# Patient Record
Sex: FEMALE | Race: WHITE | NOT HISPANIC OR LATINO | URBAN - METROPOLITAN AREA
[De-identification: names, ages, dates, MRNs, and addresses within clinical notes are randomized per-mention and may not be internally consistent; named-entity substitution may affect disease eponyms.]

---

## 2019-11-29 ENCOUNTER — TELEPHONE (OUTPATIENT)
Dept: SCHEDULING | Facility: CLINIC | Age: 84
End: 2019-11-29

## 2019-11-29 NOTE — TELEPHONE ENCOUNTER
[facility] referral called.  Dr Krystal mccauley for mitral valve regurgitation  cardiologist Dr Connor Whyte  [phone]  cardiac studies [facility] asked to get discs.  Please call patient at [phone] with an appointment. ty

## 2019-12-02 ENCOUNTER — TELEPHONE (OUTPATIENT)
Dept: CARDIOTHORACIC SURGERY | Facility: CLINIC | Age: 84
End: 2019-12-02

## 2019-12-03 NOTE — TELEPHONE ENCOUNTER
Pt returning call to [facility] to discuss tests needed prior to appt.     Pt can be reached at [phone]

## 2019-12-03 NOTE — TELEPHONE ENCOUNTER
Returned pt's call and confirmed she has all studies needed for her visit. Pt was given my direct line should she have any other questions.

## 2019-12-04 PROBLEM — I73.9 PAD (PERIPHERAL ARTERY DISEASE) (CMS/HCC): Status: ACTIVE | Noted: 2019-12-04

## 2019-12-04 PROBLEM — Z86.79 HISTORY OF CEREBRAL HEMORRHAGE: Status: ACTIVE | Noted: 2019-12-04

## 2019-12-04 PROBLEM — Z87.19 HISTORY OF SMALL BOWEL OBSTRUCTION: Status: ACTIVE | Noted: 2019-12-04

## 2019-12-04 PROBLEM — G45.9 TIA (TRANSIENT ISCHEMIC ATTACK): Status: ACTIVE | Noted: 2019-12-04

## 2019-12-04 PROBLEM — I34.0 MITRAL REGURGITATION: Status: ACTIVE | Noted: 2019-12-04

## 2019-12-04 PROBLEM — Z85.038 HISTORY OF COLON CANCER: Status: ACTIVE | Noted: 2019-12-04

## 2019-12-04 PROBLEM — Z86.73 HISTORY OF CEREBROVASCULAR ACCIDENT: Status: ACTIVE | Noted: 2019-12-04

## 2020-03-09 ENCOUNTER — TELEPHONE (OUTPATIENT)
Dept: SCHEDULING | Facility: CLINIC | Age: 85
End: 2020-03-09

## 2020-03-09 NOTE — TELEPHONE ENCOUNTER
Spoke to patient's  and confirmed he has the discs we asked him to obtain and bring to the appointment.  I explained to him that there is no way to determine the format the study was copied in ( and our ability to read it on our computers) until we have the disc and open it on our system.

## 2020-03-09 NOTE — TELEPHONE ENCOUNTER
Spouse liudmila requesting call back to determine whether or not all previous cardiac test(s) that are now on disc(s) are able to be read on the computer when pt comes in for apt.    I informed spouse we can read the disc(s), but spouse wants to be sure and requests call back.    Spouse liudmila can be reached at [phone]

## 2020-04-22 ENCOUNTER — TELEPHONE (OUTPATIENT)
Dept: CARDIOTHORACIC SURGERY | Facility: CLINIC | Age: 85
End: 2020-04-22

## 2020-04-22 NOTE — TELEPHONE ENCOUNTER
Spoke to patient. She is scared to leave her house and wanted to push her appt out to June. Patient is currently scheduled for June 9th

## 2020-06-08 ENCOUNTER — TELEPHONE (OUTPATIENT)
Dept: CARDIOTHORACIC SURGERY | Facility: CLINIC | Age: 85
End: 2020-06-08

## 2020-06-08 RX ORDER — ATORVASTATIN CALCIUM 10 MG/1
1 TABLET, FILM COATED ORAL DAILY
COMMUNITY
Start: 2020-05-25

## 2020-06-08 NOTE — TELEPHONE ENCOUNTER
LM for patient to please call us back to confirm appointment with Dr Rice as well as complete covid/travel screening

## 2020-06-09 ENCOUNTER — OFFICE VISIT (OUTPATIENT)
Dept: CARDIOTHORACIC SURGERY | Facility: CLINIC | Age: 85
End: 2020-06-09
Payer: MEDICARE

## 2020-06-09 VITALS
RESPIRATION RATE: 16 BRPM | WEIGHT: 151.8 LBS | BODY MASS INDEX: 25.92 KG/M2 | HEIGHT: 64 IN | SYSTOLIC BLOOD PRESSURE: 160 MMHG | TEMPERATURE: 98.3 F | OXYGEN SATURATION: 98 % | DIASTOLIC BLOOD PRESSURE: 96 MMHG | HEART RATE: 93 BPM

## 2020-06-09 DIAGNOSIS — Z11.59 SCREENING FOR VIRAL DISEASE: Primary | ICD-10-CM

## 2020-06-09 DIAGNOSIS — I34.0 NONRHEUMATIC MITRAL VALVE REGURGITATION: ICD-10-CM

## 2020-06-09 PROCEDURE — 99214 OFFICE O/P EST MOD 30 MIN: CPT | Mod: CS | Performed by: THORACIC SURGERY (CARDIOTHORACIC VASCULAR SURGERY)

## 2020-06-09 RX ORDER — DIVALPROEX SODIUM 125 MG/1
125 TABLET, DELAYED RELEASE ORAL DAILY
COMMUNITY

## 2020-06-09 RX ORDER — ACETAMINOPHEN AND PHENYLEPHRINE HCL 325; 5 MG/1; MG/1
10000 TABLET ORAL DAILY
COMMUNITY

## 2020-06-09 RX ORDER — MUPIROCIN 20 MG/G
1 OINTMENT TOPICAL 2 TIMES DAILY
Qty: 22 G | Refills: 0 | Status: SHIPPED | OUTPATIENT
Start: 2020-06-09 | End: 2020-06-14

## 2020-06-09 RX ORDER — DIVALPROEX SODIUM 250 MG/1
250 TABLET, DELAYED RELEASE ORAL 2 TIMES DAILY
COMMUNITY

## 2020-06-09 ASSESSMENT — ENCOUNTER SYMPTOMS
ARTHRALGIAS: 0
CONFUSION: 0
BACK PAIN: 0
SHORTNESS OF BREATH: 0
DIZZINESS: 0
LIGHT-HEADEDNESS: 0
CHEST TIGHTNESS: 0
AGITATION: 0

## 2020-06-09 NOTE — LETTER
Sue 10, 2020     Connor Whyte, [place]  [address] [place] [address]    Patient: Carlos A Ziegler  YOB: 1933  Date of Visit: 6/9/2020      Dear Dr. Whyte:    Thank you for referring Carlos A Ziegler to me for evaluation. Below are my notes for this consultation.    If you have questions, please do not hesitate to call me. I look forward to following your patient along with you.         Sincerely,        Kraig Rice MD        CC: MD Krystal Rios Jr., Scott M, MD  6/10/2020  9:53 AM  Signed  I, Kraig Rice MD, personally performed the services described in this documentation as scribed by MERE Artis in my presence, and it is both accurate and complete.    6/10/2020 9:53 AM      Kraig Rice MD  6/10/2020  9:53 AM  Signed    Cardiac Surgery    Reason for consultation:MItral regurgitaion    HPI  Patient is a 87 y.o. female here for a surgical consultation for her mitral regurgitation.  This was found during her colon cancer surgery in 2016.  She was referred by Dr. Whyte.  She was seen by Dr. Porfirio Garcia and was offered surgery to repair the valve and this was cancelled d/t severe arterial disease.  She is here for a second opinion.  She currently denies symptoms of SOB, PHELAN.  She does get fatigued but this is at the end of the day after being active all day.  She does have some LE edema.  She denies chest pain, dizziness, orthopnea.  PMH:  Seizure disorder, CVA 2001, cerebral hemmorage in 2001, TIA 2016, small bowel obstruction 2018      Medical History:   Past Medical History:   Diagnosis Date   • Colon cancer (CMS/HCC)    • History of cerebral hemorrhage 12/4/2019 2001   • History of cerebrovascular accident 12/4/2019 2001   • History of colon cancer 12/4/2019   • History of small bowel obstruction 12/4/2019   • Mitral regurgitation 12/4/2019   • PAD (peripheral artery disease) (CMS/HCC) 12/4/2019   • Seizures (CMS/HCC)    • Stroke (cerebrum)  (CMS/[facility])    • TIA (transient ischemic attack) 2019       Surgical History:   Past Surgical History:   Procedure Laterality Date   • COLON SURGERY  2016   • COLON SURGERY  2016       Allergies: Sulfamethoxazole-trimethoprim; Sulfa (sulfonamide antibiotics); Trimethoprim; and Penicillins    Current Outpatient Medications   Medication Sig Dispense Refill   • biotin 10,000 mcg capsule Take 10,000 mcg by mouth daily.     • divalproex (DEPAKOTE) 125 mg EC tablet Take 125 mg by mouth daily.     • divalproex (DEPAKOTE) 250 mg EC tablet Take 250 mg by mouth 2 (two) times a day.     • LIPITOR 10 mg tablet Take 1 tablet by mouth daily.       No current facility-administered medications for this visit.        Social History:   Social History     Socioeconomic History   • Marital status:      Spouse name: None   • Number of children: None   • Years of education: None   • Highest education level: None   Occupational History   • None   Social Needs   • Financial resource strain: None   • Food insecurity:     Worry: None     Inability: None   • Transportation needs:     Medical: None     Non-medical: None   Tobacco Use   • Smoking status: Former Smoker     Packs/day: 1.00     Years: 20.00     Pack years: 20.00     Last attempt to quit: 1980     Years since quittin.4   • Smokeless tobacco: Never Used   Substance and Sexual Activity   • Alcohol use: Yes     Alcohol/week: 2.0 - 4.0 standard drinks     Types: 2 - 4 Glasses of wine per week     Comment: 2-4 weekly   • Drug use: None   • Sexual activity: None   Lifestyle   • Physical activity:     Days per week: None     Minutes per session: None   • Stress: None   Relationships   • Social connections:     Talks on phone: None     Gets together: None     Attends [employer] service: None     Active member of club or organization: None     Attends meetings of clubs or organizations: None     Relationship status: None   • Intimate partner violence:     Fear of  "current or ex partner: None     Emotionally abused: None     Physically abused: None     Forced sexual activity: None   Other Topics Concern   • None   Social History Narrative   • None       Family History:   Family History   Problem Relation Age of Onset   • Pancreatic cancer Biological Mother    • Cancer Biological Father        Review of Systems  Review of Systems   Respiratory: Negative for chest tightness and shortness of breath.    Cardiovascular: Positive for leg swelling. Negative for chest pain.   Musculoskeletal: Negative for arthralgias, back pain and gait problem.   Neurological: Negative for dizziness and light-headedness.   Psychiatric/Behavioral: Negative for agitation, behavioral problems and confusion.   All other systems reviewed and are negative.      Objective     Vital Signs for the last 24 hours:  Visit Vitals  BP (!) 160/96 (BP Location: Left upper arm, Patient Position: Sitting)   Pulse 93   Temp 36.8 °C (98.3 °F) (Oral)   Resp 16   Ht 1.626 m (5' 4\")   Wt 68.9 kg (151 lb 12.8 oz)   SpO2 98%   BMI 26.06 kg/m²       Physical Exam   Constitutional: She is oriented to person, place, and time. She appears well-developed and well-nourished.   Looks much younger then her age.   HENT:   Head: Normocephalic and atraumatic.   Eyes: Pupils are equal, round, and reactive to light. EOM are normal.   Neck: Normal range of motion. Neck supple.   Cardiovascular: Normal rate, regular rhythm, S1 normal and S2 normal.   Murmur heard.   Systolic murmur is present with a grade of 2/6.  [place] to base   Pulmonary/Chest: Effort normal and breath sounds normal.   Musculoskeletal: She exhibits edema.   Neurological: She is alert and oriented to person, place, and time.   Skin: Skin is warm and dry. Capillary refill takes 2 to 3 seconds.   Psychiatric: She has a normal mood and affect. Her behavior is normal. Judgment and thought content normal.   Vitals reviewed.    Assessment/Plan     Mitral Insufficiency  Outside " TTE, cath and CTA reviewed by Dr. Rice.  Severe mitral regurgitation with posterior prolapse, No ruptured chorde.  Ef 65%.  Symptoms not medically managed with diuretics.  NYHC II. No chest discomfort and No syncopal or presyncopal episodes. No CAD.  Calcified femoral iliac vessels. Patient tolerating medical therapy.  Plan:  STS risk for surgical intervention/repair is 2.9%.  Patient is frail.  Frailty exam deferred d/t social distancing.  Recommend a transcatheter mitral valve repair with a Mitral clip- procedure based on frailty.  She will need a EMI prior to a procedure. She will call after this is completed to discuss how to proceed.   She may be better suited to a Clasp if her MVA is on the smaller size but all of her studies are from 2019 and she would need to repeat them in order to participate.  She will need Covid testing prior to the EMI and then again prior to the procedure.  Will need a second opinion with Dr. Pretty, 6 min walk, and PAT's prior to surgery.  I have discussed with the patient and the family the rationale for the procedure as well as possible alternatives.  We discussed potential risks and complications associated with this procedure.  Time was spent educating the patient and family about their heart disease diagnosis and treatment options. All questions and concerns were addressed.The patient verbalizes understanding and agree to proceed. She is more interested in a September surgery date.      I, MERE Artis am scribing for and in the presence of MERE Eaton

## 2020-06-09 NOTE — PROGRESS NOTES
Cardiac Surgery    Reason for consultation:MItral regurgitaion    HPI  Patient is a 87 y.o. female here for a surgical consultation for her mitral regurgitation.  This was found during her colon cancer surgery in 2016.  She was referred by Dr. Whyte.  She was seen by Dr. Porfirio Garcia and was offered surgery to repair the valve and this was cancelled d/t severe arterial disease.  She is here for a second opinion.  She currently denies symptoms of SOB, PHELAN.  She does get fatigued but this is at the end of the day after being active all day.  She does have some LE edema.  She denies chest pain, dizziness, orthopnea.  PMH:  Seizure disorder, CVA 2001, cerebral hemmorage in 2001, TIA 2016, small bowel obstruction 2018      Medical History:   Past Medical History:   Diagnosis Date   • Colon cancer (CMS/[facility])    • History of cerebral hemorrhage 12/4/2019 2001   • History of cerebrovascular accident 12/4/2019 2001   • History of colon cancer 12/4/2019   • History of small bowel obstruction 12/4/2019   • Mitral regurgitation 12/4/2019   • PAD (peripheral artery disease) (CMS/[facility]) 12/4/2019   • Seizures (CMS/[facility])    • Stroke (cerebrum) (CMS/[facility])    • TIA (transient ischemic attack) 12/4/2019 2016       Surgical History:   Past Surgical History:   Procedure Laterality Date   • COLON SURGERY  04/2016   • COLON SURGERY  07/2016       Allergies: Sulfamethoxazole-trimethoprim; Sulfa (sulfonamide antibiotics); Trimethoprim; and Penicillins    Current Outpatient Medications   Medication Sig Dispense Refill   • biotin 10,000 mcg capsule Take 10,000 mcg by mouth daily.     • divalproex (DEPAKOTE) 125 mg EC tablet Take 125 mg by mouth daily.     • divalproex (DEPAKOTE) 250 mg EC tablet Take 250 mg by mouth 2 (two) times a day.     • LIPITOR 10 mg tablet Take 1 tablet by mouth daily.       No current facility-administered medications for this visit.        Social History:   Social History     Socioeconomic History   • Marital  status:      Spouse name: None   • Number of children: None   • Years of education: None   • Highest education level: None   Occupational History   • None   Social Needs   • Financial resource strain: None   • Food insecurity:     Worry: None     Inability: None   • Transportation needs:     Medical: None     Non-medical: None   Tobacco Use   • Smoking status: Former Smoker     Packs/day: 1.00     Years: 20.00     Pack years: 20.00     Last attempt to quit: 1980     Years since quittin.4   • Smokeless tobacco: Never Used   Substance and Sexual Activity   • Alcohol use: Yes     Alcohol/week: 2.0 - 4.0 standard drinks     Types: 2 - 4 Glasses of wine per week     Comment: 2-4 weekly   • Drug use: None   • Sexual activity: None   Lifestyle   • Physical activity:     Days per week: None     Minutes per session: None   • Stress: None   Relationships   • Social connections:     Talks on phone: None     Gets together: None     Attends Yarsanism service: None     Active member of club or organization: None     Attends meetings of clubs or organizations: None     Relationship status: None   • Intimate partner violence:     Fear of current or ex partner: None     Emotionally abused: None     Physically abused: None     Forced sexual activity: None   Other Topics Concern   • None   Social History Narrative   • None       Family History:   Family History   Problem Relation Age of Onset   • Pancreatic cancer Biological Mother    • Cancer Biological Father        Review of Systems  Review of Systems   Respiratory: Negative for chest tightness and shortness of breath.    Cardiovascular: Positive for leg swelling. Negative for chest pain.   Musculoskeletal: Negative for arthralgias, back pain and gait problem.   Neurological: Negative for dizziness and light-headedness.   Psychiatric/Behavioral: Negative for agitation, behavioral problems and confusion.   All other systems reviewed and are negative.      Objective  "    Vital Signs for the last 24 hours:  Visit Vitals  BP (!) 160/96 (BP Location: Left upper arm, Patient Position: Sitting)   Pulse 93   Temp 36.8 °C (98.3 °F) (Oral)   Resp 16   Ht 1.626 m (5' 4\")   Wt 68.9 kg (151 lb 12.8 oz)   SpO2 98%   BMI 26.06 kg/m²       Physical Exam   Constitutional: She is oriented to person, place, and time. She appears well-developed and well-nourished.   Looks much younger then her age.   HENT:   Head: Normocephalic and atraumatic.   Eyes: Pupils are equal, round, and reactive to light. EOM are normal.   Neck: Normal range of motion. Neck supple.   Cardiovascular: Normal rate, regular rhythm, S1 normal and S2 normal.   Murmur heard.   Systolic murmur is present with a grade of 2/6.  [place] to base   Pulmonary/Chest: Effort normal and breath sounds normal.   Musculoskeletal: She exhibits edema.   Neurological: She is alert and oriented to person, place, and time.   Skin: Skin is warm and dry. Capillary refill takes 2 to 3 seconds.   Psychiatric: She has a normal mood and affect. Her behavior is normal. Judgment and thought content normal.   Vitals reviewed.    Assessment/Plan     Mitral Insufficiency  Outside TTE, cath and CTA reviewed by Dr. Rice.  Severe mitral regurgitation with posterior prolapse, No ruptured chorde.  Ef 65%.  Symptoms not medically managed with diuretics.  NYHC II. No chest discomfort and No syncopal or presyncopal episodes. No CAD.  Calcified femoral iliac vessels. Patient tolerating medical therapy.  Plan:  STS risk for surgical intervention/repair is 2.9%.  Patient is frail.  Frailty exam deferred d/t social distancing.  Recommend a transcatheter mitral valve repair with a Mitral clip- procedure based on frailty.  She will need a EMI prior to a procedure. She will call after this is completed to discuss how to proceed.   She may be better suited to a Clasp if her MVA is on the smaller size but all of her studies are from 2019 and she would need to repeat them " in order to participate.  She will need Covid testing prior to the EMI and then again prior to the procedure.  Will need a second opinion with Dr. Pretty, 6 min walk, and TWAN's prior to surgery.  I have discussed with the patient and the family the rationale for the procedure as well as possible alternatives.  We discussed potential risks and complications associated with this procedure.  Time was spent educating the patient and family about their heart disease diagnosis and treatment options. All questions and concerns were addressed.The patient verbalizes understanding and agree to proceed. She is more interested in a September surgery date.      IEmily CRNP am scribing for and in the presence of MERE Eaton

## 2020-06-09 NOTE — LETTER
Sue 10, 2020     Connor Whyte, [place]  [address] [place] [address]    Patient: Carlos A Ziegler  YOB: 1933  Date of Visit: 6/9/2020      Dear Dr. Whyte:    Thank you for referring Carlos A Ziegler to me for evaluation. Below are my notes for this consultation.    If you have questions, please do not hesitate to call me. I look forward to following your patient along with you.         Sincerely,        Kraig Rice MD        CC: CLARA Baires Scott M, MD  6/10/2020  9:53 AM  Signed  I, Kraig Rice MD, personally performed the services described in this documentation as scribed by MERE Artis in my presence, and it is both accurate and complete.    6/10/2020 9:53 AM      Kraig Rice MD  6/10/2020  9:53 AM  Signed    Cardiac Surgery    Reason for consultation:MItral regurgitaion    HPI  Patient is a 87 y.o. female here for a surgical consultation for her mitral regurgitation.  This was found during her colon cancer surgery in 2016.  She was referred by Dr. Whyte.  She was seen by Dr. Porfirio Garcia and was offered surgery to repair the valve and this was cancelled d/t severe arterial disease.  She is here for a second opinion.  She currently denies symptoms of SOB, PHELAN.  She does get fatigued but this is at the end of the day after being active all day.  She does have some LE edema.  She denies chest pain, dizziness, orthopnea.  PMH:  Seizure disorder, CVA 2001, cerebral hemmorage in 2001, TIA 2016, small bowel obstruction 2018      Medical History:   Past Medical History:   Diagnosis Date   • Colon cancer (CMS/HCC)    • History of cerebral hemorrhage 12/4/2019 2001   • History of cerebrovascular accident 12/4/2019 2001   • History of colon cancer 12/4/2019   • History of small bowel obstruction 12/4/2019   • Mitral regurgitation 12/4/2019   • PAD (peripheral artery disease) (CMS/HCC) 12/4/2019   • Seizures (CMS/HCC)    • Stroke (cerebrum)  (CMS/[facility])    • TIA (transient ischemic attack) 2019       Surgical History:   Past Surgical History:   Procedure Laterality Date   • COLON SURGERY  2016   • COLON SURGERY  2016       Allergies: Sulfamethoxazole-trimethoprim; Sulfa (sulfonamide antibiotics); Trimethoprim; and Penicillins    Current Outpatient Medications   Medication Sig Dispense Refill   • biotin 10,000 mcg capsule Take 10,000 mcg by mouth daily.     • divalproex (DEPAKOTE) 125 mg EC tablet Take 125 mg by mouth daily.     • divalproex (DEPAKOTE) 250 mg EC tablet Take 250 mg by mouth 2 (two) times a day.     • LIPITOR 10 mg tablet Take 1 tablet by mouth daily.       No current facility-administered medications for this visit.        Social History:   Social History     Socioeconomic History   • Marital status:      Spouse name: None   • Number of children: None   • Years of education: None   • Highest education level: None   Occupational History   • None   Social Needs   • Financial resource strain: None   • Food insecurity:     Worry: None     Inability: None   • Transportation needs:     Medical: None     Non-medical: None   Tobacco Use   • Smoking status: Former Smoker     Packs/day: 1.00     Years: 20.00     Pack years: 20.00     Last attempt to quit: 1980     Years since quittin.4   • Smokeless tobacco: Never Used   Substance and Sexual Activity   • Alcohol use: Yes     Alcohol/week: 2.0 - 4.0 standard drinks     Types: 2 - 4 Glasses of wine per week     Comment: 2-4 weekly   • Drug use: None   • Sexual activity: None   Lifestyle   • Physical activity:     Days per week: None     Minutes per session: None   • Stress: None   Relationships   • Social connections:     Talks on phone: None     Gets together: None     Attends [employer] service: None     Active member of club or organization: None     Attends meetings of clubs or organizations: None     Relationship status: None   • Intimate partner violence:     Fear of  "current or ex partner: None     Emotionally abused: None     Physically abused: None     Forced sexual activity: None   Other Topics Concern   • None   Social History Narrative   • None       Family History:   Family History   Problem Relation Age of Onset   • Pancreatic cancer Biological Mother    • Cancer Biological Father        Review of Systems  Review of Systems   Respiratory: Negative for chest tightness and shortness of breath.    Cardiovascular: Positive for leg swelling. Negative for chest pain.   Musculoskeletal: Negative for arthralgias, back pain and gait problem.   Neurological: Negative for dizziness and light-headedness.   Psychiatric/Behavioral: Negative for agitation, behavioral problems and confusion.   All other systems reviewed and are negative.      Objective     Vital Signs for the last 24 hours:  Visit Vitals  BP (!) 160/96 (BP Location: Left upper arm, Patient Position: Sitting)   Pulse 93   Temp 36.8 °C (98.3 °F) (Oral)   Resp 16   Ht 1.626 m (5' 4\")   Wt 68.9 kg (151 lb 12.8 oz)   SpO2 98%   BMI 26.06 kg/m²       Physical Exam   Constitutional: She is oriented to person, place, and time. She appears well-developed and well-nourished.   Looks much younger then her age.   HENT:   Head: Normocephalic and atraumatic.   Eyes: Pupils are equal, round, and reactive to light. EOM are normal.   Neck: Normal range of motion. Neck supple.   Cardiovascular: Normal rate, regular rhythm, S1 normal and S2 normal.   Murmur heard.   Systolic murmur is present with a grade of 2/6.  [place] to base   Pulmonary/Chest: Effort normal and breath sounds normal.   Musculoskeletal: She exhibits edema.   Neurological: She is alert and oriented to person, place, and time.   Skin: Skin is warm and dry. Capillary refill takes 2 to 3 seconds.   Psychiatric: She has a normal mood and affect. Her behavior is normal. Judgment and thought content normal.   Vitals reviewed.    Assessment/Plan     Mitral Insufficiency  Outside " TTE, cath and CTA reviewed by Dr. Rice.  Severe mitral regurgitation with posterior prolapse, No ruptured chorde.  Ef 65%.  Symptoms not medically managed with diuretics.  NYHC II. No chest discomfort and No syncopal or presyncopal episodes. No CAD.  Calcified femoral iliac vessels. Patient tolerating medical therapy.  Plan:  STS risk for surgical intervention/repair is 2.9%.  Patient is frail.  Frailty exam deferred d/t social distancing.  Recommend a transcatheter mitral valve repair with a Mitral clip- procedure based on frailty.  She will need a EMI prior to a procedure. She will call after this is completed to discuss how to proceed.   She may be better suited to a Clasp if her MVA is on the smaller size but all of her studies are from 2019 and she would need to repeat them in order to participate.  She will need Covid testing prior to the EMI and then again prior to the procedure.  Will need a second opinion with Dr. Pretty, 6 min walk, and PAT's prior to surgery.  I have discussed with the patient and the family the rationale for the procedure as well as possible alternatives.  We discussed potential risks and complications associated with this procedure.  Time was spent educating the patient and family about their heart disease diagnosis and treatment options. All questions and concerns were addressed.The patient verbalizes understanding and agree to proceed. She is more interested in a September surgery date.      I, MERE Artis am scribing for and in the presence of MERE Eaton

## 2020-06-09 NOTE — LETTER
June 9, 2020     Connor Whyte, [place]  [address] [place] [address]    Patient: Carlos A Ziegler  YOB: 1933  Date of Visit: 6/9/2020      Dear Dr. Whyte:    Thank you for referring Carlos A Ziegler to me for evaluation. Below are my notes for this consultation.    If you have questions, please do not hesitate to call me. I look forward to following your patient along with you.         Sincerely,        Kraig Rice MD        CC: MD Alo Rios Jr., Samantha, CRNP  6/9/2020  2:43 PM  Sign at close encounter    Cardiac Surgery    Reason for consultation:    HPI  Patient is a 87 y.o. female here for a surgical consultation for ***.  They were referred by   ***.     Medical History:   Past Medical History:   Diagnosis Date   • Colon cancer (CMS/HCC)    • History of cerebral hemorrhage 12/4/2019 2001   • History of cerebrovascular accident 12/4/2019 2001   • History of colon cancer 12/4/2019   • History of small bowel obstruction 12/4/2019   • Mitral regurgitation 12/4/2019   • PAD (peripheral artery disease) (CMS/HCC) 12/4/2019   • Seizures (CMS/HCC)    • Stroke (cerebrum) (CMS/[facility])    • TIA (transient ischemic attack) 12/4/2019 2016       Surgical History:   Past Surgical History:   Procedure Laterality Date   • COLON SURGERY  04/2016   • COLON SURGERY  07/2016       Allergies: Sulfamethoxazole-trimethoprim; Sulfa (sulfonamide antibiotics); Trimethoprim; and Penicillins    Current Outpatient Medications   Medication Sig Dispense Refill   • biotin 10,000 mcg capsule Take 10,000 mcg by mouth daily.     • divalproex (DEPAKOTE) 125 mg EC tablet Take 125 mg by mouth daily.     • divalproex (DEPAKOTE) 250 mg EC tablet Take 250 mg by mouth 2 (two) times a day.     • LIPITOR 10 mg tablet Take 1 tablet by mouth daily.       No current facility-administered medications for this visit.        Social History:   Social History     Socioeconomic History   • Marital status:  "     Spouse name: None   • Number of children: None   • Years of education: None   • Highest education level: None   Occupational History   • None   Social Needs   • Financial resource strain: None   • Food insecurity:     Worry: None     Inability: None   • Transportation needs:     Medical: None     Non-medical: None   Tobacco Use   • Smoking status: Former Smoker     Packs/day: 1.00     Years: 20.00     Pack years: 20.00     Last attempt to quit: 1980     Years since quittin.4   • Smokeless tobacco: Never Used   Substance and Sexual Activity   • Alcohol use: Yes     Alcohol/week: 2.0 - 4.0 standard drinks     Types: 2 - 4 Glasses of wine per week     Comment: 2-4 weekly   • Drug use: None   • Sexual activity: None   Lifestyle   • Physical activity:     Days per week: None     Minutes per session: None   • Stress: None   Relationships   • Social connections:     Talks on phone: None     Gets together: None     Attends Oriental orthodox service: None     Active member of club or organization: None     Attends meetings of clubs or organizations: None     Relationship status: None   • Intimate partner violence:     Fear of current or ex partner: None     Emotionally abused: None     Physically abused: None     Forced sexual activity: None   Other Topics Concern   • None   Social History Narrative   • None       Family History:   Family History   Problem Relation Age of Onset   • Pancreatic cancer Biological Mother    • Cancer Biological Father        Review of Systems  Review of Systems    Objective     Vital Signs for the last 24 hours:  Visit Vitals  BP (!) 160/96 (BP Location: Left upper arm, Patient Position: Sitting)   Pulse 93   Temp 36.8 °C (98.3 °F) (Oral)   Resp 16   Ht 1.626 m (5' 4\")   Wt 68.9 kg (151 lb 12.8 oz)   SpO2 98%   BMI 26.06 kg/m²       Physical Exam        Assessment/Plan     Mitral Insufficiency  Outside TTE, cath and CTA reviewed by Dr. Rice.  Severe mitral regurgitation with posterior " prolapse, No ruptured chorde.  Ef 65%.  Symptoms not medically managed with diuretics.  NYHC II. No chest discomfort and No syncopal or presyncopal episodes. Patient tolerating medical therapy.  Plan:  STS risk for surgical intervention/repair is 2.9%.  Patient is frail.  Frailty exam deferred d/t social distancing.  Recommend a transcatheter mitral valve repair with a Mitral clip- procedure based on frailty.  Will need a second opinion with Dr. Pretty, 6 min walk, and PAT's prior to surgery.  I have discussed with the patient and the family the rationale for the procedure as well as possible alternatives.  We discussed potential risks and complications associated with this procedure.  The patient and family understand and agree to proceed.    I, MERE Artis am scribing for and in the presence of MERE Eaton

## 2020-06-10 NOTE — PROGRESS NOTES
I, Kraig Rice MD, personally performed the services described in this documentation as scribed by MERE Artis in my presence, and it is both accurate and complete.    6/10/2020 9:53 AM

## 2020-06-16 ENCOUNTER — DOCUMENTATION (OUTPATIENT)
Dept: CARDIOLOGY | Facility: CLINIC | Age: 85
End: 2020-06-16

## 2020-06-16 NOTE — PROGRESS NOTES
Subjective     Patient ID: Carlos A Ziegler is a 87 y.o. female.    HPI    Review of Systems    Objective     There were no vitals filed for this visit.  There is no height or weight on file to calculate BMI.    Physical Exam    Assessment/Plan       Reviewed All studies with Dr. Rice  Agree with technical aspects and indications for mitraclip in this anatomy

## 2020-06-26 ENCOUNTER — TELEPHONE (OUTPATIENT)
Dept: CARDIOLOGY | Facility: HOSPITAL | Age: 85
End: 2020-06-26

## 2020-06-26 NOTE — TELEPHONE ENCOUNTER
Spoke directly to Patient regarding scheduled EMI on 6- at 1000 a.m. Provided instructions and answered questions. She informed me that she had blood work drawn for Covid 19 on 6- at 1:00pm. Unable to reach lab where patient had her blood work performed to obtain results. Called patients PCP to see if they may have results but no covid results as of today. Will follow up on Monday morning 6-.

## 2020-06-26 NOTE — TELEPHONE ENCOUNTER
LM:  Results of Covid test  - negative , as reported from [facility] results line ( [phone] )  Fax results will be sent to [facility] (fax#[phone])    Told patient that EMI for  6/29 10am is confirmed.

## 2020-06-29 ENCOUNTER — HOSPITAL ENCOUNTER (OUTPATIENT)
Dept: CARDIOLOGY | Facility: HOSPITAL | Age: 85
Discharge: HOME | End: 2020-06-29
Attending: NURSE PRACTITIONER
Payer: MEDICARE

## 2020-06-29 VITALS
SYSTOLIC BLOOD PRESSURE: 151 MMHG | HEIGHT: 62 IN | HEART RATE: 66 BPM | DIASTOLIC BLOOD PRESSURE: 86 MMHG | RESPIRATION RATE: 22 BRPM | OXYGEN SATURATION: 98 % | WEIGHT: 145 LBS | BODY MASS INDEX: 26.68 KG/M2

## 2020-06-29 DIAGNOSIS — I34.0 NONRHEUMATIC MITRAL VALVE REGURGITATION: ICD-10-CM

## 2020-06-29 LAB — BSA FOR ECHO PROCEDURE: 1.7 M2

## 2020-06-29 PROCEDURE — 63600000 HC DRUGS/DETAIL CODE: Performed by: INTERNAL MEDICINE

## 2020-06-29 PROCEDURE — 93312 ECHO TRANSESOPHAGEAL: CPT | Mod: 26,GA | Performed by: INTERNAL MEDICINE

## 2020-06-29 PROCEDURE — 93312 ECHO TRANSESOPHAGEAL: CPT | Mod: GA

## 2020-06-29 PROCEDURE — 25000000 HC PHARMACY GENERAL: Performed by: INTERNAL MEDICINE

## 2020-06-29 PROCEDURE — 93325 DOPPLER ECHO COLOR FLOW MAPG: CPT | Mod: 26,GA | Performed by: INTERNAL MEDICINE

## 2020-06-29 PROCEDURE — 93320 DOPPLER ECHO COMPLETE: CPT | Mod: 26,GA | Performed by: INTERNAL MEDICINE

## 2020-06-29 RX ORDER — FENTANYL CITRATE 50 UG/ML
INJECTION, SOLUTION INTRAMUSCULAR; INTRAVENOUS
Status: COMPLETED | OUTPATIENT
Start: 2020-06-29 | End: 2020-06-29

## 2020-06-29 RX ORDER — LIDOCAINE HYDROCHLORIDE 20 MG/ML
JELLY TOPICAL
Status: COMPLETED | OUTPATIENT
Start: 2020-06-29 | End: 2020-06-29

## 2020-06-29 RX ORDER — MIDAZOLAM HYDROCHLORIDE 2 MG/2ML
INJECTION, SOLUTION INTRAMUSCULAR; INTRAVENOUS
Status: COMPLETED | OUTPATIENT
Start: 2020-06-29 | End: 2020-06-29

## 2020-06-29 RX ADMIN — MIDAZOLAM HYDROCHLORIDE 1 MG: 1 INJECTION, SOLUTION INTRAMUSCULAR; INTRAVENOUS at 11:23

## 2020-06-29 RX ADMIN — FENTANYL CITRATE 12.5 MCG: 50 INJECTION, SOLUTION INTRAMUSCULAR; INTRAVENOUS at 11:31

## 2020-06-29 RX ADMIN — MIDAZOLAM HYDROCHLORIDE 2 MG: 1 INJECTION, SOLUTION INTRAMUSCULAR; INTRAVENOUS at 11:14

## 2020-06-29 RX ADMIN — FENTANYL CITRATE 25 MCG: 50 INJECTION, SOLUTION INTRAMUSCULAR; INTRAVENOUS at 11:14

## 2020-06-29 RX ADMIN — MIDAZOLAM HYDROCHLORIDE 1 MG: 1 INJECTION, SOLUTION INTRAMUSCULAR; INTRAVENOUS at 11:26

## 2020-06-29 RX ADMIN — LIDOCAINE HYDROCHLORIDE 15 ML: 20 JELLY TOPICAL at 11:28

## 2020-06-29 RX ADMIN — FENTANYL CITRATE 12.5 MCG: 50 INJECTION, SOLUTION INTRAMUSCULAR; INTRAVENOUS at 11:37

## 2020-06-29 NOTE — Clinical Note
Patient position: left lateral. Bite block inserted. EMI probe inserted via blind insertion technique. Probe inserted without difficulty. EMI in progress. Complications: no complications.

## 2020-06-29 NOTE — PRE-PROCEDURE NOTE
Patient evaluated in the echolab.    Mallampati 3.    Risks of procedure signed and consent form sign. All questions addressed.Patient is moderate risk for complications because of advanced age.    Chaitanya Mejía  Cardiology Fellow PGY5  Pager [phone]

## 2020-06-29 NOTE — Clinical Note
Patient position: left lateral. Bite block removed. EMI probe removed. . Complications: no complications.

## 2020-07-03 DIAGNOSIS — I34.0 NONRHEUMATIC MITRAL VALVE REGURGITATION: ICD-10-CM

## 2020-07-08 ENCOUNTER — TELEPHONE (OUTPATIENT)
Dept: CARDIOTHORACIC SURGERY | Facility: CLINIC | Age: 85
End: 2020-07-08

## 2020-07-08 DIAGNOSIS — I34.0 NONRHEUMATIC MITRAL VALVE REGURGITATION: Primary | ICD-10-CM

## 2020-07-08 NOTE — TELEPHONE ENCOUNTER
LM for patient to discuss her EMI results and the Structural heart teams recommendations moving forward.  Asked that she return my call to discuss.

## 2020-07-09 NOTE — TELEPHONE ENCOUNTER
Spoke to patient to discuss obtaining a cardiac mri to further assess her mitral regurgitation.  She is under the impression this has been done at [facility] last August.   I do not see that in Care Everywhere.  We discussed that her disease process may have progressed since then even if it has been done and that repeating the study would be helpful.  She is interested in having this done closer to home.  She is agreeable to this.  The MRI disc will need to be sent to us for review by our team prior to an appointment.  Please call patient to discuss scheduling.  Inna- she requested you as she has worked with you before and appreciates how nice you are.

## 2020-07-15 ENCOUNTER — TELEPHONE (OUTPATIENT)
Dept: CARDIOTHORACIC SURGERY | Facility: CLINIC | Age: 85
End: 2020-07-15

## 2020-07-15 NOTE — TELEPHONE ENCOUNTER
Spoke to patient now scheduled for MRI at [facility] on 8/4/20 at 9:00AM. Script faxed [phone] Central scheduling

## 2020-07-29 NOTE — TELEPHONE ENCOUNTER
Please be sure she knows we need this on disc to review and then we will call her to discuss the plans based on those results.

## 2020-07-29 NOTE — TELEPHONE ENCOUNTER
Spoke to patient and she says Inna did go over everything with her and she will remind them to send the disc. She asks for the phone call once the disc is received and I made her aware that she will be called to discuss plans based on the results and she is happy with this.

## 2020-07-30 DIAGNOSIS — Z00.6 EXAMINATION FOR NORMAL COMPARISON OR CONTROL IN CLINICAL RESEARCH: Primary | ICD-10-CM

## 2020-07-30 NOTE — TELEPHONE ENCOUNTER
Spoke to patient and she is in agreement to mail the disc herself. She says she will mail it at the post office with a tracking number.

## 2020-07-30 NOTE — TELEPHONE ENCOUNTER
I do not think asking them to send disk is the best way to go.  She should obtain the disc herself and mail it herself with tracking info.

## 2020-08-07 DIAGNOSIS — I34.0 NONRHEUMATIC MITRAL VALVE REGURGITATION: ICD-10-CM

## 2020-08-13 NOTE — TELEPHONE ENCOUNTER
Topher, spouse wants to know if office received disc of heart mri from [facility]    [phone]

## 2020-08-13 NOTE — TELEPHONE ENCOUNTER
Tracy Bustamante MA the tracking number is  #[id number]  Feel free to call her with any other questions

## 2020-08-25 ENCOUNTER — TELEPHONE (OUTPATIENT)
Dept: CARDIOTHORACIC SURGERY | Facility: CLINIC | Age: 85
End: 2020-08-25

## 2020-08-25 NOTE — TELEPHONE ENCOUNTER
Message left for Dr. Ross who was cardiologist who read the Cardiac MRI at [facility] to see if he could provide me with flow measurements done during the MRI that in order to quantify the MR on her current study.

## 2020-08-26 ENCOUNTER — TELEPHONE (OUTPATIENT)
Dept: CARDIOTHORACIC SURGERY | Facility: CLINIC | Age: 85
End: 2020-08-26

## 2020-08-26 DIAGNOSIS — I34.0 NONRHEUMATIC MITRAL VALVE REGURGITATION: Primary | ICD-10-CM

## 2020-08-26 NOTE — TELEPHONE ENCOUNTER
Cannot do limited, will also need LV stroke volume. No elsa needed. But need LV SV and Aortic flows at same time.

## 2020-08-26 NOTE — TELEPHONE ENCOUNTER
I spoke with Dr. Rsos who acknowlwdged that no phase contrast or flows were done at the time of the Cardiac MRI.  He will be speaking to the radiology department and asking them to have the patient return for a limited study that consists of flows.  I will call patient and ask her to call me when this has been done and to obtain on disc for us to review.

## 2020-08-28 LAB
BUN SERPL-MCNC: 26 MG/DL (ref 7–25)
CREAT SERPL-MCNC: 0.9 MG/DL (ref 0.6–1.1)

## 2020-08-31 DIAGNOSIS — I34.0 NONRHEUMATIC MITRAL VALVE REGURGITATION: ICD-10-CM

## 2020-09-08 ENCOUNTER — TELEPHONE (OUTPATIENT)
Dept: SCHEDULING | Facility: CLINIC | Age: 85
End: 2020-09-08

## 2020-09-08 NOTE — TELEPHONE ENCOUNTER
Spoke to her and let her know the study is being repeated at [facility] due to lack of flows to diagnose mitral valve regurgitation severity at [facility].

## 2020-09-08 NOTE — TELEPHONE ENCOUNTER
Kylee from [facility] would like call back asap to go over MRI taken there. The specialists there are considering further testing and Kylee inquires if they are needed. She would like call back today  [phone]

## 2020-09-14 ENCOUNTER — TELEPHONE (OUTPATIENT)
Dept: CARDIOTHORACIC SURGERY | Facility: CLINIC | Age: 85
End: 2020-09-14

## 2020-09-14 ENCOUNTER — TRANSCRIBE ORDERS (OUTPATIENT)
Dept: CARDIOTHORACIC SURGERY | Facility: CLINIC | Age: 85
End: 2020-09-14

## 2020-09-14 ENCOUNTER — HOSPITAL ENCOUNTER (OUTPATIENT)
Dept: RADIOLOGY | Facility: HOSPITAL | Age: 85
Discharge: HOME | End: 2020-09-14
Attending: NURSE PRACTITIONER
Payer: MEDICARE

## 2020-09-14 DIAGNOSIS — I34.0 NONRHEUMATIC MITRAL VALVE REGURGITATION: Primary | ICD-10-CM

## 2020-09-14 DIAGNOSIS — I34.0 NONRHEUMATIC MITRAL (VALVE) INSUFFICIENCY: ICD-10-CM

## 2020-09-14 DIAGNOSIS — I34.0 NONRHEUMATIC MITRAL (VALVE) INSUFFICIENCY: Primary | ICD-10-CM

## 2020-09-14 DIAGNOSIS — I34.0 NONRHEUMATIC MITRAL VALVE REGURGITATION: ICD-10-CM

## 2020-09-14 PROCEDURE — 75557 CARDIAC MRI FOR MORPH: CPT

## 2020-09-14 PROCEDURE — 75565 CARD MRI VELOC FLOW MAPPING: CPT

## 2020-09-15 NOTE — TELEPHONE ENCOUNTER
Spoke with pts and  regarding her apt and the  is really upset not really sure where to go since they are receiving conflicting information. Please call  to explain clinical side, thank you.

## 2020-09-16 NOTE — TELEPHONE ENCOUNTER
No worries, thank you for clarifying. The  was so concerned. I will call back today and schedule for 6 months.

## 2020-09-16 NOTE — TELEPHONE ENCOUNTER
She will need a 6 month visit with an echo. (Not end of Sept) Sorry I missed the 6 part last time.  I spoke to patient and  at length and they have a clearer understanding of the additional tests we did and why our opinion differed from her other providers.

## 2021-03-23 ENCOUNTER — DOCUMENTATION (OUTPATIENT)
Dept: CARDIOTHORACIC SURGERY | Facility: CLINIC | Age: 86
End: 2021-03-23

## 2021-03-23 ENCOUNTER — HOSPITAL ENCOUNTER (OUTPATIENT)
Dept: CARDIOLOGY | Facility: HOSPITAL | Age: 86
Discharge: HOME | End: 2021-03-23
Attending: NURSE PRACTITIONER
Payer: MEDICARE

## 2021-03-23 ENCOUNTER — OFFICE VISIT (OUTPATIENT)
Dept: CARDIOTHORACIC SURGERY | Facility: CLINIC | Age: 86
End: 2021-03-23
Payer: MEDICARE

## 2021-03-23 VITALS
DIASTOLIC BLOOD PRESSURE: 80 MMHG | SYSTOLIC BLOOD PRESSURE: 147 MMHG | HEIGHT: 62 IN | BODY MASS INDEX: 26.68 KG/M2 | WEIGHT: 145 LBS

## 2021-03-23 VITALS
BODY MASS INDEX: 27.05 KG/M2 | TEMPERATURE: 96 F | RESPIRATION RATE: 16 BRPM | OXYGEN SATURATION: 94 % | WEIGHT: 147 LBS | HEIGHT: 62 IN | SYSTOLIC BLOOD PRESSURE: 189 MMHG | HEART RATE: 95 BPM | DIASTOLIC BLOOD PRESSURE: 100 MMHG

## 2021-03-23 DIAGNOSIS — I34.0 NONRHEUMATIC MITRAL VALVE REGURGITATION: ICD-10-CM

## 2021-03-23 DIAGNOSIS — I34.0 MITRAL VALVE INSUFFICIENCY, UNSPECIFIED ETIOLOGY: Primary | ICD-10-CM

## 2021-03-23 LAB
AORTIC ROOT ANNULUS - M-MODE: 2.84 CM
AV PEAK GRADIENT: 6.55 MMHG
AV PEAK VELOCITY-S: 1.28 M/S
AV REGURGITATION PRESSURE HALF TIME: 635.05 MS
BSA FOR ECHO PROCEDURE: 1.7 M2
CUSP SEPARATION: 1.88 CM
DOP CALC RVOT VTI: 12.75 CM
E WAVE DECELERATION TIME: 165.85 MS
E/A RATIO: 1.41
E/E' RATIO: 22.16
E/LAT E' RATIO: 21.39
EDV (BP): 66.81 ML
EF (A4C): 73.29 %
EF A2C: 58.15 %
EJECTION FRACTION: 64.61 %
ESV (BP): 23.65 ML
LA ESV (BP): 75.96 ML
LA ESV INDEX (A2C): 45.59 ML/M2
LA ESV INDEX (BP): 44.68 ML/M2
LA/AORTA RATIO: 1.61
LAAS-AP2: 25.67 CM2
LAAS-AP4: 23.97 CM2
LAD 2D - M-MODE: 4.56 CM
LAV-S: 77.5 ML
LEFT ATRIUM VOLUME INDEX: 42.24 ML/M2
LEFT ATRIUM VOLUME: 71.8 ML
LEFT VENTRICLE DIASTOLIC VOLUME INDEX: 37.88 ML/M2
LEFT VENTRICLE DIASTOLIC VOLUME: 64.4 ML
LEFT VENTRICLE SYSTOLIC VOLUME INDEX: 10.12 ML/M2
LEFT VENTRICLE SYSTOLIC VOLUME: 17.2 ML
LV DIASTOLIC VOLUME: 66.9 ML
LV ESV (APICAL 2 CHAMBER): 28 ML
LVCI: 1.46 LITERS PER MINUTE PER SQUARE METER
LVCO: 2.44 LITERS PER MINUTE
LVEDVI(A2C): 39.35 ML/M2
LVEDVI(BP): 39.3 ML/M2
LVESVI(A2C): 16.47 ML/M2
LVESVI(BP): 13.91 ML/M2
LVOT MG: 1.59 MMHG
LVOT MV: 0.59 M/S
LVOT PEAK VELOCITY: 0.91 M/S
LVOT PG: 3.32 MMHG
LVOT VTI: 23.56 CM
MR FLOW: 57.95 ML
MR VELOCITY: 5.73 M/S
MV E'TISSUE VEL-LAT: 0.07 M/S
MV E'TISSUE VEL-MED: 0.07 M/S
MV PEAK A VEL: 1.06 M/S
MV PEAK E VEL: 1.5 M/S
MV VALVE AREA P 1/2 METHOD: 4.57 CM2
MV VALVE AREA PISA METHOD: 28.16 SQUARE MILLIMETER
PISA ALIAS VEL MV: 0.35 M/S
PISA RADIUS: 0.86 CM
TRICUSPID VALVE PEAK REGURGITATION VELOCITY: 3.19 M/S

## 2021-03-23 PROCEDURE — 99214 OFFICE O/P EST MOD 30 MIN: CPT | Performed by: THORACIC SURGERY (CARDIOTHORACIC VASCULAR SURGERY)

## 2021-03-23 PROCEDURE — 93306 TTE W/DOPPLER COMPLETE: CPT | Mod: 26 | Performed by: INTERNAL MEDICINE

## 2021-03-23 PROCEDURE — 93306 TTE W/DOPPLER COMPLETE: CPT

## 2021-03-23 RX ORDER — METOPROLOL SUCCINATE 25 MG/1
25 TABLET, EXTENDED RELEASE ORAL DAILY
Qty: 30 TABLET | Refills: 3 | Status: SHIPPED | OUTPATIENT
Start: 2021-03-23 | End: 2022-04-27

## 2021-03-23 ASSESSMENT — ENCOUNTER SYMPTOMS
CHEST TIGHTNESS: 0
LIGHT-HEADEDNESS: 0
FATIGUE: 1
CONFUSION: 0
ARTHRALGIAS: 1
SHORTNESS OF BREATH: 0
PALPITATIONS: 0
BACK PAIN: 1
AGITATION: 0
DIZZINESS: 0

## 2021-03-23 NOTE — LETTER
March 23, 2021     Dany Sullivan Jr., MD  [address]  [address]  [facility] [address]    Patient: Carlos A Ziegler  YOB: 1933  Date of Visit: 3/23/2021      Dear Dr. Sullivan:    Thank you for referring Carlos A Ziegler to me for evaluation. Below are my notes for this consultation.    If you have questions, please do not hesitate to call me. I look forward to following your patient along with you.         Sincerely,        Kraig Rice MD        CC: MD Alo Gaona Samantha, CRNP  3/23/2021  2:56 PM  Sign when Signing Visit    Cardiac Surgery    Reason for consultation:Mitral Regurgitation    HPI  Patient is a 87 y.o. female here for a return surgical consultation for her mitral regurgitation surveillence.  This was found during her colon cancer surgery in 2016.  She was referred by Dr. Whyte.  She was seen by Dr. Porfirio Garcia in July 2019 and was offered surgery to repair the valve with a robot but this was cancelled d/t severe arterial disease and inability to do minimally invasive approach.  She was then seen here for a second opinion.  At her last visit it was recommend to have a transcatheter mitral valve repair with a Mitral clip procedure.  She had a EMI prior to that procedure and her mitral regurgitation was found to be moderate.  Her procedure was then postponed as she was asymptomatic on no medications or atrial fibrillation.  She currently denies SOB, PHELAN, LE edema, chest pain, dizziness, orthopnea.  She currently is limited to activity due to this pain.   Her biggest complaint is fatigue.  She relates this to her advanced age.  Her  is here today and states he has never seen her SOB.  PMH:  Seizure disorder, CVA 2001, cerebral hemmorage in 2001, TIA 2016, small bowel obstruction 2018      PCP:  Dany Sullivan  Cardiologist: Dr. Aaron Whyte    Medical History:   Past Medical History:   Diagnosis Date   • Colon cancer (CMS/HCC)    • History of cerebral  hemorrhage 2019   • History of cerebrovascular accident 2019   • History of colon cancer 2019   • History of small bowel obstruction 2019   • Mitral regurgitation 2019   • PAD (peripheral artery disease) (CMS/[facility]) 2019   • Seizures (CMS/[facility])    • Stroke (cerebrum) (CMS/[facility])    • TIA (transient ischemic attack) 2019       Surgical History:   Past Surgical History:   Procedure Laterality Date   • COLON SURGERY  2016   • COLON SURGERY  2016       Allergies: Sulfamethoxazole-trimethoprim, Sulfa (sulfonamide antibiotics), Trimethoprim, and Penicillins    Current Outpatient Medications   Medication Sig Dispense Refill   • biotin 10,000 mcg capsule Take 10,000 mcg by mouth daily.     • divalproex (DEPAKOTE) 125 mg EC tablet Take 125 mg by mouth daily.     • divalproex (DEPAKOTE) 250 mg EC tablet Take 250 mg by mouth 2 (two) times a day.     • LIPITOR 10 mg tablet Take 1 tablet by mouth daily.       No current facility-administered medications for this visit.        Social History:   Social History     Socioeconomic History   • Marital status:      Spouse name: None   • Number of children: None   • Years of education: None   • Highest education level: None   Occupational History   • None   Social Needs   • Financial resource strain: None   • Food insecurity     Worry: None     Inability: None   • Transportation needs     Medical: None     Non-medical: None   Tobacco Use   • Smoking status: Former Smoker     Packs/day: 1.00     Years: 20.00     Pack years: 20.00     Quit date:      Years since quittin.2   • Smokeless tobacco: Never Used   Substance and Sexual Activity   • Alcohol use: Yes     Alcohol/week: 2.0 - 4.0 standard drinks     Types: 2 - 4 Glasses of wine per week     Comment: 2-4 weekly   • Drug use: Never   • Sexual activity: Defer   Lifestyle   • Physical activity     Days per week: None     Minutes per session: None   • Stress: None  "  Relationships   • Social connections     Talks on phone: None     Gets together: None     Attends Yarsanism service: None     Active member of club or organization: None     Attends meetings of clubs or organizations: None     Relationship status: None   • Intimate partner violence     Fear of current or ex partner: None     Emotionally abused: None     Physically abused: None     Forced sexual activity: None   Other Topics Concern   • None   Social History Narrative   • None       Family History:   Family History   Problem Relation Age of Onset   • Pancreatic cancer Biological Mother    • Cancer Biological Father        Review of Systems  Review of Systems   Constitutional: Positive for fatigue.   Respiratory: Negative for chest tightness and shortness of breath.    Cardiovascular: Negative for chest pain, palpitations and leg swelling.   Musculoskeletal: Positive for arthralgias, back pain and gait problem.   Neurological: Negative for dizziness and light-headedness.   Psychiatric/Behavioral: Negative for agitation, behavioral problems and confusion.   All other systems reviewed and are negative.      Objective     Vital Signs for the last 24 hours:  Visit Vitals  BP (!) 225/132 (BP Location: Right upper arm, Patient Position: Sitting)   Pulse 95   Temp (!) 35.6 °C (96 °F) (Temporal)   Resp 16   Ht 1.575 m (5' 2\")   Wt 66.7 kg (147 lb)   SpO2 94%   BMI 26.89 kg/m²       Physical Exam  Vitals signs reviewed.   Constitutional:       General: She is not in acute distress.     Appearance: She is well-developed.   HENT:      Head: Normocephalic.   Eyes:      Pupils: Pupils are equal, round, and reactive to light.   Neck:      Musculoskeletal: Normal range of motion and neck supple.      Vascular: No JVD.   Cardiovascular:      Rate and Rhythm: Normal rate and regular rhythm.      Pulses: Normal pulses.      Heart sounds: S1 normal and S2 normal. Murmur present. Systolic murmur present with a grade of 3/6.      " Comments: [place] to   Pulmonary:      Effort: Pulmonary effort is normal.      Breath sounds: Normal breath sounds.   Abdominal:      General: Bowel sounds are normal.      Palpations: Abdomen is soft.   Musculoskeletal: Normal range of motion.         General: No swelling.      Right lower le+ Edema present.      Left lower le+ Edema present.   Skin:     General: Skin is warm and dry.      Capillary Refill: Capillary refill takes 2 to 3 seconds.   Neurological:      General: No focal deficit present.      Mental Status: She is alert and oriented to person, place, and time. Mental status is at baseline.   Psychiatric:         Mood and Affect: Mood normal.         Behavior: Behavior normal.         Thought Content: Thought content normal.         Judgment: Judgment normal.         Cardiac Imaging   TRANSESOPHAGEAL ECHO (EMI) 2020    Narrative The risks of the procedure were discussed with the patient and informed   consent was obtained. The patient was sedated with 4 mg IV Versed and 50    mcg IV Fentanyl. The EMI probe was inserted without difficulty and the   patient tolerated the procedure well.    1. Normal left ventricular wall thickness and cavity with preserved   systolic function. Estimated EF 60 - 65 %. No regional wall motion   abnormalities.   2. Three cusped aortic valve. Mild aortic regurgitation. Normal aortic   dimensions. Simple atheroma in the visualized portions of the descending   aorta without complex elements.   3. Moderate mitral regurgitation with eccentric anteriorly directed jet   due to small flail segment of P2 with ruptured chordae. Pisa radius 0.7cm,   EROA 0.19 cm2.  Mean transmitral gradient 2mmHg. Normal pulmonary vein   flow by doppler.   4. Normal left atrial size. No thrombus or mass seen in the left atrium or   left atrial appendage.   5. Lipomatous hypertrophy of the interatrial septum. No shunt across.   6. Normal right ventricular size with preserved systolic  function. Normal   right atrial size.    7. Structurally normal tricuspid valve. Trace tricuspid regurgitation.   Estimated right ventricular systolic pressure of 20 mmHg + RAP.  8. Pulmonic valve not well visualized.   9. No pericardial effusion.   10. No previous study for direct comparison.            .  Assessment/Plan   Mitral Insufficiency  Assessment:   Echoes reviewed by Dr. Dr. Rice.  Severe mitral regurgitation with posterior prolapse.  Degenerative MR. E wave 1.4, Normal LV size, moderate LA size, Mild Tricuspid regurgitation, RSVP 40mmHG, Ef 65.  Symptoms not medically managed with diuretics.  NYHC I. No chest discomfort and No syncopal or presyncopal episodes. Patient not on any medical therapy.    Plan:  STS risk for surgical intervention/repair is 2.9%.  Patient is frail and failed frailty exam.  Recommend a transcatheter mitral valve repair with a Mitral Clap- procedure.  She was presented with the opportunity to enroll in the Mitral Clasp device.      Prior to surgery she will need to be started on antihypertensives.  Will start her on Toprol 25mg daily.  She will need to folllow with her PCP or cardiologist closer to home to monitor and increase her medications if needed.      Will need a  CTA chest abdomen and pelvis- disc that which was done at [facility].  Will also need Carotid ultrasound, 6 min walk, dental evaluation( given form) and PAT's prior to surgery.  She will also need to see an interventional cardiologist.  We will have her come back for reassessment with Dr. White in a few weeks and get signed up with a date at that time.        Surgery date will be offered at the next visit..    Will NOT need to stop any current medications before surgery. The mupirocin prescription will be sent once a surgery date set.  It will be sent to the pharmacy and will begin 5 days in advance of surgery as ordered.      I have discussed with the patient and the family the rationale for the  procedure as well as possible alternatives.  We discussed potential risks and complications associated with this procedure.  The risks include, but are not limited to: bleeding, infection, arrhythmias, myocardial infarction, stroke, death, renal as well as pulmonary insufficiency and the possibility of blood transfusions,  permanent pacemaker placement intra-aortic balloon placement,  assist device placement, cardiac wire perforation, and requirement of CPB.  Time was spent educating the patient and family about their heart disease diagnosis and treatment options. All questions and concerns were addressed.The patient and family understand and agree to proceed.      IEmily CRNP am scribing for and in the presence of {doctorname:[id number]}      MERE Artis

## 2021-03-23 NOTE — PROGRESS NOTES
Cardiac Surgery    Reason for consultation:Mitral Regurgitation    HPI  Patient is a 87 y.o. female here for a return surgical consultation for her mitral regurgitation surveillence.  This was found during her colon cancer surgery in 2016.  She was referred by Dr. Whyte.  She was seen by Dr. Porfirio Garcia in July 2019 and was offered surgery to repair the valve with a robot but this was cancelled d/t severe arterial disease and inability to do minimally invasive approach.  She was then seen here for a second opinion.  At her last visit it was recommend to have a transcatheter mitral valve repair with a Mitral clip procedure.  She had a EMI prior to that procedure and her mitral regurgitation was found to be moderate.  Her procedure was then postponed as she was asymptomatic on no medications or atrial fibrillation.  She currently denies SOB, PHELAN, LE edema, chest pain, dizziness, orthopnea.  She currently is limited to activity due to this pain.   Her biggest complaint is fatigue.  She relates this to her advanced age.  Her  is here today and states he has never seen her SOB.  PMH:  Seizure disorder, CVA 2001, cerebral hemmorage in 2001, TIA 2016, small bowel obstruction 2018      PCP:  Dany Sullivan  Cardiologist: Dr. Aaron Whyte    Medical History:   Past Medical History:   Diagnosis Date   • Colon cancer (CMS/HCC)    • History of cerebral hemorrhage 12/4/2019 2001   • History of cerebrovascular accident 12/4/2019 2001   • History of colon cancer 12/4/2019   • History of small bowel obstruction 12/4/2019   • Mitral regurgitation 12/4/2019   • PAD (peripheral artery disease) (CMS/HCC) 12/4/2019   • Seizures (CMS/HCC)    • Stroke (cerebrum) (CMS/HCC)    • TIA (transient ischemic attack) 12/4/2019 2016       Surgical History:   Past Surgical History:   Procedure Laterality Date   • COLON SURGERY  04/2016   • COLON SURGERY  07/2016       Allergies: Sulfamethoxazole-trimethoprim, Sulfa (sulfonamide  antibiotics), Trimethoprim, and Penicillins    Current Outpatient Medications   Medication Sig Dispense Refill   • biotin 10,000 mcg capsule Take 10,000 mcg by mouth daily.     • divalproex (DEPAKOTE) 125 mg EC tablet Take 125 mg by mouth daily.     • divalproex (DEPAKOTE) 250 mg EC tablet Take 250 mg by mouth 2 (two) times a day.     • LIPITOR 10 mg tablet Take 1 tablet by mouth daily.       No current facility-administered medications for this visit.        Social History:   Social History     Socioeconomic History   • Marital status:      Spouse name: None   • Number of children: None   • Years of education: None   • Highest education level: None   Occupational History   • None   Social Needs   • Financial resource strain: None   • Food insecurity     Worry: None     Inability: None   • Transportation needs     Medical: None     Non-medical: None   Tobacco Use   • Smoking status: Former Smoker     Packs/day: 1.00     Years: 20.00     Pack years: 20.00     Quit date:      Years since quittin.2   • Smokeless tobacco: Never Used   Substance and Sexual Activity   • Alcohol use: Yes     Alcohol/week: 2.0 - 4.0 standard drinks     Types: 2 - 4 Glasses of wine per week     Comment: 2-4 weekly   • Drug use: Never   • Sexual activity: Defer   Lifestyle   • Physical activity     Days per week: None     Minutes per session: None   • Stress: None   Relationships   • Social connections     Talks on phone: None     Gets together: None     Attends Protestant service: None     Active member of club or organization: None     Attends meetings of clubs or organizations: None     Relationship status: None   • Intimate partner violence     Fear of current or ex partner: None     Emotionally abused: None     Physically abused: None     Forced sexual activity: None   Other Topics Concern   • None   Social History Narrative   • None       Family History:   Family History   Problem Relation Age of Onset   • Pancreatic  "cancer Biological Mother    • Cancer Biological Father        Review of Systems  Review of Systems   Constitutional: Positive for fatigue.   Respiratory: Negative for chest tightness and shortness of breath.    Cardiovascular: Negative for chest pain, palpitations and leg swelling.   Musculoskeletal: Positive for arthralgias, back pain and gait problem.   Neurological: Negative for dizziness and light-headedness.   Psychiatric/Behavioral: Negative for agitation, behavioral problems and confusion.   All other systems reviewed and are negative.      Objective     Vital Signs for the last 24 hours:  Visit Vitals  BP (!) 225/132 (BP Location: Right upper arm, Patient Position: Sitting)   Pulse 95   Temp (!) 35.6 °C (96 °F) (Temporal)   Resp 16   Ht 1.575 m (5' 2\")   Wt 66.7 kg (147 lb)   SpO2 94%   BMI 26.89 kg/m²       Physical Exam  Vitals signs reviewed.   Constitutional:       General: She is not in acute distress.     Appearance: She is well-developed.   HENT:      Head: Normocephalic.   Eyes:      Pupils: Pupils are equal, round, and reactive to light.   Neck:      Musculoskeletal: Normal range of motion and neck supple.      Vascular: No JVD.   Cardiovascular:      Rate and Rhythm: Normal rate and regular rhythm.      Pulses: Normal pulses.      Heart sounds: S1 normal and S2 normal. Murmur present. Systolic murmur present with a grade of 3/6.      Comments: [place] to   Pulmonary:      Effort: Pulmonary effort is normal.      Breath sounds: Normal breath sounds.   Abdominal:      General: Bowel sounds are normal.      Palpations: Abdomen is soft.   Musculoskeletal: Normal range of motion.         General: No swelling.      Right lower le+ Edema present.      Left lower le+ Edema present.   Skin:     General: Skin is warm and dry.      Capillary Refill: Capillary refill takes 2 to 3 seconds.   Neurological:      General: No focal deficit present.      Mental Status: She is alert and oriented to person, " place, and time. Mental status is at baseline.   Psychiatric:         Mood and Affect: Mood normal.         Behavior: Behavior normal.         Thought Content: Thought content normal.         Judgment: Judgment normal.         Cardiac Imaging   TRANSESOPHAGEAL ECHO (EMI) 06/29/2020    Narrative The risks of the procedure were discussed with the patient and informed   consent was obtained. The patient was sedated with 4 mg IV Versed and 50    mcg IV Fentanyl. The EMI probe was inserted without difficulty and the   patient tolerated the procedure well.    1. Normal left ventricular wall thickness and cavity with preserved   systolic function. Estimated EF 60 - 65 %. No regional wall motion   abnormalities.   2. Three cusped aortic valve. Mild aortic regurgitation. Normal aortic   dimensions. Simple atheroma in the visualized portions of the descending   aorta without complex elements.   3. Moderate mitral regurgitation with eccentric anteriorly directed jet   due to small flail segment of P2 with ruptured chordae. Pisa radius 0.7cm,   EROA 0.19 cm2.  Mean transmitral gradient 2mmHg. Normal pulmonary vein   flow by doppler.   4. Normal left atrial size. No thrombus or mass seen in the left atrium or   left atrial appendage.   5. Lipomatous hypertrophy of the interatrial septum. No shunt across.   6. Normal right ventricular size with preserved systolic function. Normal   right atrial size.    7. Structurally normal tricuspid valve. Trace tricuspid regurgitation.   Estimated right ventricular systolic pressure of 20 mmHg + RAP.  8. Pulmonic valve not well visualized.   9. No pericardial effusion.   10. No previous study for direct comparison.            .  Assessment/Plan   Mitral Insufficiency  Assessment:   Echoes reviewed by Dr. Dr. Rice.  Severe mitral regurgitation with posterior prolapse.  Degenerative MR. E wave 1.4, Normal LV size, moderate LA size, Mild Tricuspid regurgitation, RSVP 40mmHG, Ef 65.  Symptoms  not medically managed with diuretics.  [facility] I. No chest discomfort and No syncopal or presyncopal episodes. Patient not on any medical therapy.    Plan:  STS risk for surgical intervention/repair is 2.9%.  Patient is frail and failed frailty exam.  Recommend a transcatheter mitral valve repair with a Mitral Clap- procedure.  She was presented with the opportunity to enroll in the Mitral Clasp device.      Prior to surgery she will need to be started on antihypertensives.  Will start her on Toprol 25mg daily.  She will need to folllow with her PCP or cardiologist closer to home to monitor and increase her medications if needed.      Will need a  CTA chest abdomen and pelvis- disc that which was done at [facility].  Will also need Carotid ultrasound, 6 min walk, dental evaluation( given form) and PAT's prior to surgery.  She will also need to see an interventional cardiologist.  We will have her come back for reassessment with Dr. White in a few weeks and get signed up with a date at that time.        Surgery date will be offered at the next visit..    Will NOT need to stop any current medications before surgery. The mupirocin prescription will be sent once a surgery date set.  It will be sent to the pharmacy and will begin 5 days in advance of surgery as ordered.      I have discussed with the patient and the family the rationale for the procedure as well as possible alternatives.  We discussed potential risks and complications associated with this procedure.  The risks include, but are not limited to: bleeding, infection, arrhythmias, myocardial infarction, stroke, death, renal as well as pulmonary insufficiency and the possibility of blood transfusions,  permanent pacemaker placement intra-aortic balloon placement,  assist device placement, cardiac wire perforation, and requirement of CPB.  Time was spent educating the patient and family about their heart disease diagnosis and treatment options. All questions  and concerns were addressed.The patient and family understand and agree to proceed.      I, MERE Artis am scribing for and in the presence of Dr.Dr. Rice    I, Kraig Rice MD, personally performed the services described in this documentation as scribed by MERE Artis in my presence, and it is both accurate and complete.    3/24/2021 6:51 AM    MERE Artis

## 2021-03-23 NOTE — LETTER
March 23, 2021     CELESTE Louis C  [address]  [facility]  [place] [place] [address]    Patient: Carlos A Ziegler  YOB: 1933  Date of Visit: 3/23/2021      Dear Dr. Joy:    Thank you for referring Carlos A Ziegler to me for evaluation. Below are my notes for this consultation.    If you have questions, please do not hesitate to call me. I look forward to following your patient along with you.         Sincerely,        Kraig Rice MD        CC: No Recipients  Emily Prajapati CRNP  3/23/2021  2:56 PM  Sign when Signing Visit    Cardiac Surgery    Reason for consultation:Mitral Regurgitation    HPI  Patient is a 87 y.o. female here for a return surgical consultation for her mitral regurgitation surveillence.  This was found during her colon cancer surgery in 2016.  She was referred by Dr. Whyte.  She was seen by Dr. Porfirio Garcia in July 2019 and was offered surgery to repair the valve with a robot but this was cancelled d/t severe arterial disease and inability to do minimally invasive approach.  She was then seen here for a second opinion.  At her last visit it was recommend to have a transcatheter mitral valve repair with a Mitral clip procedure.  She had a EMI prior to that procedure and her mitral regurgitation was found to be moderate.  Her procedure was then postponed as she was asymptomatic on no medications or atrial fibrillation.  She currently denies SOB, PHELAN, LE edema, chest pain, dizziness, orthopnea.  She currently is limited to activity due to this pain.   Her biggest complaint is fatigue.  She relates this to her advanced age.  Her  is here today and states he has never seen her SOB.  PMH:  Seizure disorder, CVA 2001, cerebral hemmorage in 2001, TIA 2016, small bowel obstruction 2018      PCP:  Dany Sullivan  Cardiologist: Dr. Aaron Whyte    Medical History:   Past Medical History:   Diagnosis Date   • Colon cancer (CMS/HCC)    • History  of cerebral hemorrhage 2019   • History of cerebrovascular accident 2019   • History of colon cancer 2019   • History of small bowel obstruction 2019   • Mitral regurgitation 2019   • PAD (peripheral artery disease) (CMS/[facility]) 2019   • Seizures (CMS/[facility])    • Stroke (cerebrum) (CMS/[facility])    • TIA (transient ischemic attack) 2019       Surgical History:   Past Surgical History:   Procedure Laterality Date   • COLON SURGERY  2016   • COLON SURGERY  2016       Allergies: Sulfamethoxazole-trimethoprim, Sulfa (sulfonamide antibiotics), Trimethoprim, and Penicillins    Current Outpatient Medications   Medication Sig Dispense Refill   • biotin 10,000 mcg capsule Take 10,000 mcg by mouth daily.     • divalproex (DEPAKOTE) 125 mg EC tablet Take 125 mg by mouth daily.     • divalproex (DEPAKOTE) 250 mg EC tablet Take 250 mg by mouth 2 (two) times a day.     • LIPITOR 10 mg tablet Take 1 tablet by mouth daily.       No current facility-administered medications for this visit.        Social History:   Social History     Socioeconomic History   • Marital status:      Spouse name: None   • Number of children: None   • Years of education: None   • Highest education level: None   Occupational History   • None   Social Needs   • Financial resource strain: None   • Food insecurity     Worry: None     Inability: None   • Transportation needs     Medical: None     Non-medical: None   Tobacco Use   • Smoking status: Former Smoker     Packs/day: 1.00     Years: 20.00     Pack years: 20.00     Quit date:      Years since quittin.2   • Smokeless tobacco: Never Used   Substance and Sexual Activity   • Alcohol use: Yes     Alcohol/week: 2.0 - 4.0 standard drinks     Types: 2 - 4 Glasses of wine per week     Comment: 2-4 weekly   • Drug use: Never   • Sexual activity: Defer   Lifestyle   • Physical activity     Days per week: None     Minutes per session: None   •  "Stress: None   Relationships   • Social connections     Talks on phone: None     Gets together: None     Attends Gnosticism service: None     Active member of club or organization: None     Attends meetings of clubs or organizations: None     Relationship status: None   • Intimate partner violence     Fear of current or ex partner: None     Emotionally abused: None     Physically abused: None     Forced sexual activity: None   Other Topics Concern   • None   Social History Narrative   • None       Family History:   Family History   Problem Relation Age of Onset   • Pancreatic cancer Biological Mother    • Cancer Biological Father        Review of Systems  Review of Systems   Constitutional: Positive for fatigue.   Respiratory: Negative for chest tightness and shortness of breath.    Cardiovascular: Negative for chest pain, palpitations and leg swelling.   Musculoskeletal: Positive for arthralgias, back pain and gait problem.   Neurological: Negative for dizziness and light-headedness.   Psychiatric/Behavioral: Negative for agitation, behavioral problems and confusion.   All other systems reviewed and are negative.      Objective     Vital Signs for the last 24 hours:  Visit Vitals  BP (!) 225/132 (BP Location: Right upper arm, Patient Position: Sitting)   Pulse 95   Temp (!) 35.6 °C (96 °F) (Temporal)   Resp 16   Ht 1.575 m (5' 2\")   Wt 66.7 kg (147 lb)   SpO2 94%   BMI 26.89 kg/m²       Physical Exam  Vitals signs reviewed.   Constitutional:       General: She is not in acute distress.     Appearance: She is well-developed.   HENT:      Head: Normocephalic.   Eyes:      Pupils: Pupils are equal, round, and reactive to light.   Neck:      Musculoskeletal: Normal range of motion and neck supple.      Vascular: No JVD.   Cardiovascular:      Rate and Rhythm: Normal rate and regular rhythm.      Pulses: Normal pulses.      Heart sounds: S1 normal and S2 normal. Murmur present. Systolic murmur present with a grade of " 3/6.      Comments: [place] to   Pulmonary:      Effort: Pulmonary effort is normal.      Breath sounds: Normal breath sounds.   Abdominal:      General: Bowel sounds are normal.      Palpations: Abdomen is soft.   Musculoskeletal: Normal range of motion.         General: No swelling.      Right lower le+ Edema present.      Left lower le+ Edema present.   Skin:     General: Skin is warm and dry.      Capillary Refill: Capillary refill takes 2 to 3 seconds.   Neurological:      General: No focal deficit present.      Mental Status: She is alert and oriented to person, place, and time. Mental status is at baseline.   Psychiatric:         Mood and Affect: Mood normal.         Behavior: Behavior normal.         Thought Content: Thought content normal.         Judgment: Judgment normal.         Cardiac Imaging   TRANSESOPHAGEAL ECHO (EMI) 2020    Narrative The risks of the procedure were discussed with the patient and informed   consent was obtained. The patient was sedated with 4 mg IV Versed and 50    mcg IV Fentanyl. The EMI probe was inserted without difficulty and the   patient tolerated the procedure well.    1. Normal left ventricular wall thickness and cavity with preserved   systolic function. Estimated EF 60 - 65 %. No regional wall motion   abnormalities.   2. Three cusped aortic valve. Mild aortic regurgitation. Normal aortic   dimensions. Simple atheroma in the visualized portions of the descending   aorta without complex elements.   3. Moderate mitral regurgitation with eccentric anteriorly directed jet   due to small flail segment of P2 with ruptured chordae. Pisa radius 0.7cm,   EROA 0.19 cm2.  Mean transmitral gradient 2mmHg. Normal pulmonary vein   flow by doppler.   4. Normal left atrial size. No thrombus or mass seen in the left atrium or   left atrial appendage.   5. Lipomatous hypertrophy of the interatrial septum. No shunt across.   6. Normal right ventricular size with preserved  systolic function. Normal   right atrial size.    7. Structurally normal tricuspid valve. Trace tricuspid regurgitation.   Estimated right ventricular systolic pressure of 20 mmHg + RAP.  8. Pulmonic valve not well visualized.   9. No pericardial effusion.   10. No previous study for direct comparison.            .  Assessment/Plan   Mitral Insufficiency  Assessment:   Echoes reviewed by Dr. Dr. Rice.  Severe mitral regurgitation with posterior prolapse.  Degenerative MR. E wave 1.4, Normal LV size, moderate LA size, Mild Tricuspid regurgitation, RSVP 40mmHG, Ef 65.  Symptoms not medically managed with diuretics.  [facility] I. No chest discomfort and No syncopal or presyncopal episodes. Patient not on any medical therapy.    Plan:  STS risk for surgical intervention/repair is 2.9%.  Patient is frail and failed frailty exam.  Recommend a transcatheter mitral valve repair with a Mitral Clap- procedure.  She was presented with the opportunity to enroll in the Mitral Clasp device.      Prior to surgery she will need to be started on antihypertensives.  Will start her on Toprol 25mg daily.  She will need to folllow with her PCP or cardiologist closer to home to monitor and increase her medications if needed.      Will need a  CTA chest abdomen and pelvis- disc that which was done at [facility].  Will also need Carotid ultrasound, 6 min walk, dental evaluation( given form) and PAT's prior to surgery.  She will also need to see an interventional cardiologist.  We will have her come back for reassessment with Dr. White in a few weeks and get signed up with a date at that time.        Surgery date will be offered at the next visit..    Will NOT need to stop any current medications before surgery. The mupirocin prescription will be sent once a surgery date set.  It will be sent to the pharmacy and will begin 5 days in advance of surgery as ordered.      I have discussed with the patient and the family the rationale for  the procedure as well as possible alternatives.  We discussed potential risks and complications associated with this procedure.  The risks include, but are not limited to: bleeding, infection, arrhythmias, myocardial infarction, stroke, death, renal as well as pulmonary insufficiency and the possibility of blood transfusions,  permanent pacemaker placement intra-aortic balloon placement,  assist device placement, cardiac wire perforation, and requirement of CPB.  Time was spent educating the patient and family about their heart disease diagnosis and treatment options. All questions and concerns were addressed.The patient and family understand and agree to proceed.      Emily RODARTE CRNP am scribing for and in the presence of MERE Keen

## 2021-03-23 NOTE — LETTER
March 24, 2021     Dany Sullivan Jr., MD  [address]  [address]  [facility] [address]    Patient: Carlos A Ziegler  YOB: 1933  Date of Visit: 3/23/2021      Dear Dr. Sullivan:    Thank you for referring Carlos A Ziegler to me for evaluation. Below are my notes for this consultation.    If you have questions, please do not hesitate to call me. I look forward to following your patient along with you.         Sincerely,        Kraig Rice MD        CC: MD Krystal Gaona Scott M, MD  3/24/2021  6:51 AM  Signed    Cardiac Surgery    Reason for consultation:Mitral Regurgitation    HPI  Patient is a 87 y.o. female here for a return surgical consultation for her mitral regurgitation surveillence.  This was found during her colon cancer surgery in 2016.  She was referred by Dr. Whyte.  She was seen by Dr. Porfirio Garcia in July 2019 and was offered surgery to repair the valve with a robot but this was cancelled d/t severe arterial disease and inability to do minimally invasive approach.  She was then seen here for a second opinion.  At her last visit it was recommend to have a transcatheter mitral valve repair with a Mitral clip procedure.  She had a EMI prior to that procedure and her mitral regurgitation was found to be moderate.  Her procedure was then postponed as she was asymptomatic on no medications or atrial fibrillation.  She currently denies SOB, PHELAN, LE edema, chest pain, dizziness, orthopnea.  She currently is limited to activity due to this pain.   Her biggest complaint is fatigue.  She relates this to her advanced age.  Her  is here today and states he has never seen her SOB.  PMH:  Seizure disorder, CVA 2001, cerebral hemmorage in 2001, TIA 2016, small bowel obstruction 2018      PCP:  Dany Sullivan  Cardiologist: Dr. Aaron Whyte    Medical History:   Past Medical History:   Diagnosis Date   • Colon cancer (CMS/HCC)    • History of cerebral hemorrhage 12/4/2019        • History of cerebrovascular accident 2019   • History of colon cancer 2019   • History of small bowel obstruction 2019   • Mitral regurgitation 2019   • PAD (peripheral artery disease) (CMS/[facility]) 2019   • Seizures (CMS/[facility])    • Stroke (cerebrum) (CMS/[facility])    • TIA (transient ischemic attack) 2019       Surgical History:   Past Surgical History:   Procedure Laterality Date   • COLON SURGERY  2016   • COLON SURGERY  2016       Allergies: Sulfamethoxazole-trimethoprim, Sulfa (sulfonamide antibiotics), Trimethoprim, and Penicillins    Current Outpatient Medications   Medication Sig Dispense Refill   • biotin 10,000 mcg capsule Take 10,000 mcg by mouth daily.     • divalproex (DEPAKOTE) 125 mg EC tablet Take 125 mg by mouth daily.     • divalproex (DEPAKOTE) 250 mg EC tablet Take 250 mg by mouth 2 (two) times a day.     • LIPITOR 10 mg tablet Take 1 tablet by mouth daily.       No current facility-administered medications for this visit.        Social History:   Social History     Socioeconomic History   • Marital status:      Spouse name: None   • Number of children: None   • Years of education: None   • Highest education level: None   Occupational History   • None   Social Needs   • Financial resource strain: None   • Food insecurity     Worry: None     Inability: None   • Transportation needs     Medical: None     Non-medical: None   Tobacco Use   • Smoking status: Former Smoker     Packs/day: 1.00     Years: 20.00     Pack years: 20.00     Quit date:      Years since quittin.2   • Smokeless tobacco: Never Used   Substance and Sexual Activity   • Alcohol use: Yes     Alcohol/week: 2.0 - 4.0 standard drinks     Types: 2 - 4 Glasses of wine per week     Comment: 2-4 weekly   • Drug use: Never   • Sexual activity: Defer   Lifestyle   • Physical activity     Days per week: None     Minutes per session: None   • Stress: None   Relationships   •  "Social connections     Talks on phone: None     Gets together: None     Attends Muslim service: None     Active member of club or organization: None     Attends meetings of clubs or organizations: None     Relationship status: None   • Intimate partner violence     Fear of current or ex partner: None     Emotionally abused: None     Physically abused: None     Forced sexual activity: None   Other Topics Concern   • None   Social History Narrative   • None       Family History:   Family History   Problem Relation Age of Onset   • Pancreatic cancer Biological Mother    • Cancer Biological Father        Review of Systems  Review of Systems   Constitutional: Positive for fatigue.   Respiratory: Negative for chest tightness and shortness of breath.    Cardiovascular: Negative for chest pain, palpitations and leg swelling.   Musculoskeletal: Positive for arthralgias, back pain and gait problem.   Neurological: Negative for dizziness and light-headedness.   Psychiatric/Behavioral: Negative for agitation, behavioral problems and confusion.   All other systems reviewed and are negative.      Objective     Vital Signs for the last 24 hours:  Visit Vitals  BP (!) 225/132 (BP Location: Right upper arm, Patient Position: Sitting)   Pulse 95   Temp (!) 35.6 °C (96 °F) (Temporal)   Resp 16   Ht 1.575 m (5' 2\")   Wt 66.7 kg (147 lb)   SpO2 94%   BMI 26.89 kg/m²       Physical Exam  Vitals signs reviewed.   Constitutional:       General: She is not in acute distress.     Appearance: She is well-developed.   HENT:      Head: Normocephalic.   Eyes:      Pupils: Pupils are equal, round, and reactive to light.   Neck:      Musculoskeletal: Normal range of motion and neck supple.      Vascular: No JVD.   Cardiovascular:      Rate and Rhythm: Normal rate and regular rhythm.      Pulses: Normal pulses.      Heart sounds: S1 normal and S2 normal. Murmur present. Systolic murmur present with a grade of 3/6.      Comments: [place] to "   Pulmonary:      Effort: Pulmonary effort is normal.      Breath sounds: Normal breath sounds.   Abdominal:      General: Bowel sounds are normal.      Palpations: Abdomen is soft.   Musculoskeletal: Normal range of motion.         General: No swelling.      Right lower le+ Edema present.      Left lower le+ Edema present.   Skin:     General: Skin is warm and dry.      Capillary Refill: Capillary refill takes 2 to 3 seconds.   Neurological:      General: No focal deficit present.      Mental Status: She is alert and oriented to person, place, and time. Mental status is at baseline.   Psychiatric:         Mood and Affect: Mood normal.         Behavior: Behavior normal.         Thought Content: Thought content normal.         Judgment: Judgment normal.         Cardiac Imaging   TRANSESOPHAGEAL ECHO (EMI) 2020    Narrative The risks of the procedure were discussed with the patient and informed   consent was obtained. The patient was sedated with 4 mg IV Versed and 50    mcg IV Fentanyl. The EMI probe was inserted without difficulty and the   patient tolerated the procedure well.    1. Normal left ventricular wall thickness and cavity with preserved   systolic function. Estimated EF 60 - 65 %. No regional wall motion   abnormalities.   2. Three cusped aortic valve. Mild aortic regurgitation. Normal aortic   dimensions. Simple atheroma in the visualized portions of the descending   aorta without complex elements.   3. Moderate mitral regurgitation with eccentric anteriorly directed jet   due to small flail segment of P2 with ruptured chordae. Pisa radius 0.7cm,   EROA 0.19 cm2.  Mean transmitral gradient 2mmHg. Normal pulmonary vein   flow by doppler.   4. Normal left atrial size. No thrombus or mass seen in the left atrium or   left atrial appendage.   5. Lipomatous hypertrophy of the interatrial septum. No shunt across.   6. Normal right ventricular size with preserved systolic function. Normal   right  atrial size.    7. Structurally normal tricuspid valve. Trace tricuspid regurgitation.   Estimated right ventricular systolic pressure of 20 mmHg + RAP.  8. Pulmonic valve not well visualized.   9. No pericardial effusion.   10. No previous study for direct comparison.            .  Assessment/Plan   Mitral Insufficiency  Assessment:   Echoes reviewed by Dr. Dr. Rice.  Severe mitral regurgitation with posterior prolapse.  Degenerative MR. E wave 1.4, Normal LV size, moderate LA size, Mild Tricuspid regurgitation, RSVP 40mmHG, Ef 65.  Symptoms not medically managed with diuretics.  [facility] I. No chest discomfort and No syncopal or presyncopal episodes. Patient not on any medical therapy.    Plan:  STS risk for surgical intervention/repair is 2.9%.  Patient is frail and failed frailty exam.  Recommend a transcatheter mitral valve repair with a Mitral Clap- procedure.  She was presented with the opportunity to enroll in the Mitral Clasp device.      Prior to surgery she will need to be started on antihypertensives.  Will start her on Toprol 25mg daily.  She will need to folllow with her PCP or cardiologist closer to home to monitor and increase her medications if needed.      Will need a  CTA chest abdomen and pelvis- disc that which was done at [facility].  Will also need Carotid ultrasound, 6 min walk, dental evaluation( given form) and PAT's prior to surgery.  She will also need to see an interventional cardiologist.  We will have her come back for reassessment with Dr. White in a few weeks and get signed up with a date at that time.        Surgery date will be offered at the next visit..    Will NOT need to stop any current medications before surgery. The mupirocin prescription will be sent once a surgery date set.  It will be sent to the pharmacy and will begin 5 days in advance of surgery as ordered.      I have discussed with the patient and the family the rationale for the procedure as well as possible  alternatives.  We discussed potential risks and complications associated with this procedure.  The risks include, but are not limited to: bleeding, infection, arrhythmias, myocardial infarction, stroke, death, renal as well as pulmonary insufficiency and the possibility of blood transfusions,  permanent pacemaker placement intra-aortic balloon placement,  assist device placement, cardiac wire perforation, and requirement of CPB.  Time was spent educating the patient and family about their heart disease diagnosis and treatment options. All questions and concerns were addressed.The patient and family understand and agree to proceed.      IEmily CRNP am scribing for and in the presence of Kraig Ramirez MD, personally performed the services described in this documentation as scribed by MERE Artis in my presence, and it is both accurate and complete.    3/24/2021 6:51 AM    MERE Artis

## 2021-03-23 NOTE — PROGRESS NOTES
The following baseline frailty exams were completed:    __ fail__  Test  __ fail__ 5 Meter Walk Test  __ fail__ Subjective        1. Albumin Testing  Serum Albumin: - No results found for: ALBUMIN     Height: 157cm    Weight: 66.7kg  BMI: 26.89  Date Obtained - 03/23/21   Note: Albumin Level < 3.6 g/dL meets cutoff for frailty.    2. Kwan Activities of Daily Living    Activities Points (1 or 0 ) INDEPENDENCE: (1 POINT) NO supervision, direction or personal assistance   DEPENDENCE: (0 POINTS) WITH supervision, direction, personal assistance, or total care   Bathing Points   1 (1 POINT) Bathes self completely or needs help in bathing only a single part of the body such as the back, genital area or disabled extremity.   (0 POINTS) Needs help with bathing more than one part of thebody, getting in or out of the tub or shower.  Requires total bathing   Dressing Points   1 (1 POINT) Gets clothes from closets and drawers and puts on clothes and outer garments complete with fasteners. May have help tying shoes.   (0 POINTS) Needs help with dressing self or needs to be completely dressed.   Toileting Points   1 1 POINT) Goes to toilet, gets on and off, arranges clothes, cleans genital area without help.   (0 POINTS) Needs help transferring to the toilet, cleaning self or uses bedpan or commode.   Transferring Points   1 1 POINT) Moves in and out of bed or chair unassisted. Mechanical transferring aides are acceptable.   (0 POINTS) Needs help in moving from bed to chair or requires a complete transfer.   Continence Points   1 (1 POINT) Exercises complete self control over urination and defecation.   (0 POINTS) Is partially or totally incontinent of bowel or bladder.   Feeding Points   1 1 POINT) Gets food from plate into mouth without help. Preparation of food may be done by another person.   0 POINTS) Needs partial or totalhelp with feeding or requires parenteral feeding.     Total Points   6         Note: if patient  "completes 2/6 or less he or she meets cutoff for frailty.       3.  Strength  Note to the Examiner: Elbow should be at a 90 degree angle, with arm not resting on table or \"pinned\" against chest wall. All trials should be completed with the dynamometer in the dominant hand.    Grasp 1: 20 kg  Grasp 2: 18 kg  Grasp 3: 18 kg  AV.7 kg    Men                                        Cutoff for  strength (Kg) criterion for frailty  BMI   < 24                                  < 29 kg  BMI   24.1 - 26                           < 30 kg  BMI   26.1 - 28                           < 30 kg  BMI   > 28                                  < 32 kg    Women                                   Cutoff for  strength (Kg) criterion for frailty  BMI   <  23                                  < 17 kg  BMI   23.1 - 26                            < 17.3 kg  BMI   26.1 - 29                            < 18 kg  BMI   >  29                                  < 21 kg    (Appendix, Fried et al)  _________________________________________________________________    4. 5 Meter Walk Test    5M Walk 1: ___7.36___  s/5m  5M Walk 2: ___6.82____s/5m  5M Walk 3: ___6.93____s/5m    5M Walk AVG:___7.04__ s/5m    Utilized walking aid? no    Men                                         Cutoff time to walk 15 feet criterion for frailty  Height < 173 cm                      > 7 seconds  Height > 173 cm                      > 6 seconds    Women  Height < 159 cm                       >7 seconds  Height > 159 cm                       >6 seconds    Note: If patient exceeds walk-time according to height in above table he/she meets req for frailty.  [place] Cardiomyopathy Questionnaire (KCCQ-12)    The following questions refer to your heart failure and how it may affect your life. Please read and complete the following  questions. There are no right or wrong answers. Please maria t the answer that best applies to you.      1. Heart failure affects different " people in different ways. Some feel shortness of breath while others feel fatigue. Please  indicate how much you are limited by heart failure (shortness of breath or fatigue) in your ability to do the following  activities over the past 2 weeks.    Activity     Extremely Limited    (1) Quite a bit Limited    (2) Moderately Limited    (3) Slightly Limited    (4) Not At All Limited    (5) Limited for other reasons or do not do the activity   (6)     Score   A. Showering/bathing       4   B. Walking 1 geovani on level ground       3   C. Hurrying or jogging  (as if to catch a bus)       6         2. Over the past 2 weeks, how many times did you have swelling in your feet, ankles or legs when you woke up in the morning?      Every morning    (1) 3 or more times  per week but  not every day  (2) 1-2 times per week     (3) Less than once a week     (4) Never over the past 2 weeks     (5)     Score          5         3. Over the past 2 weeks, on average, how many times has fatigue limited your ability to do what you wanted?    All of  the time    (1) Several times  per day    (2) At least  once a day    (3) 3 or more times  per week but  not every day  (4) 1-2 times  per week    (5) Less than  once a week    (6) Never over the  past 2 weeks    (7)     Score            5         4. Over the past 2 weeks, on average, how many times has shortness of breath limited your ability to do what you wanted?    All of  the time    (1) Several times  per day    (2) At least  once a day    (3) 3 or more times  per week but  not every day  (4) 1-2 times  per week    (5) Less than  once a week    (6) Never over the  past 2 weeks    (7)   Score            5         5. Over the past 2 weeks, on average, how many times have you been forced to sleep sitting up in a chair or with at  least 3 pillows to prop you up because of shortness of breath?      Every morning    (1) 3 or more times  per week but  not every day  (2) 1-2 times per week     (3)  Less than once a week     (4) Never over the past 2 weeks     (5)   Score          5         6. Over the past 2 weeks, how much has your heart failure limited your enjoyment of life?    It has extremely  limited my enjoyment  of life    (1) It has limited my  enjoyment of life  quite a bit      (2) It has moderately  limited my enjoyment  of life    (3) It has slightly  limited my enjoyment  of life    (4) It has not limited  my enjoyment  of life at all      (5)     Score          5         7. If you had to spend the rest of your life with your heart failure the way it is right now, how would you feel about this?    Not at all  satisfied    (1) Mostly  dissatisfied    (2) Somewhat  satisfied    (3) Mostly  satisfied    (4) Completely  satisfied    (5)   Score          5         8. How much does your heart failure affect your lifestyle? Please indicate how your heart failure may have limited your  participation in the following activities over the past 2 weeks.        Activity     Severely Limited    (1) Limited  Quite a bit     (2) Moderately Limited    (3) Slightly Limited    (4) Did not limit at all    (5) Does not apply or did not do for other reasons  (6)   Score   A.  Hobbies, recreational  activities         5   B. Working or doing  household chores         3   C. Visiting family or  friends out of your  home         6

## 2021-04-08 ENCOUNTER — TELEPHONE (OUTPATIENT)
Dept: CARDIOTHORACIC SURGERY | Facility: CLINIC | Age: 86
End: 2021-04-08

## 2021-04-08 DIAGNOSIS — I34.0 MITRAL VALVE INSUFFICIENCY, UNSPECIFIED ETIOLOGY: Primary | ICD-10-CM

## 2021-04-08 NOTE — TELEPHONE ENCOUNTER
Pt has a Dr. White appt on 4/13.  She will need CTA c/a/p and a repeat EMI (as per research) prior to MV clasp. Could we get these prior to Dr. White appt? Assume we will be moving BG appt then.

## 2021-04-08 NOTE — TELEPHONE ENCOUNTER
Was able to get patient in tomorrow for CTA and covid at [facility] as well as EMI on Monday but she is not happy with this. She says we are aware that her CTA was complete and she is bringing the disc to us on Tuesday. Also, she says she recently had a EMI and is unsure why were making her do this again. She would like a call back from someone to talk over this. Can you call her at . TY!

## 2021-04-08 NOTE — TELEPHONE ENCOUNTER
Received call from pt.  Pt stated she could not make the Monday, Apr 12th, EMI and that she already had a CT of her Abd/Pelvis.  She has the CT ready to give us.      She asked if the EMI could be moved to Tuesday Apr 13th because she will be coming in to see Dr White on that day.  She reported finding it difficult to get a ride at times.

## 2021-04-08 NOTE — TELEPHONE ENCOUNTER
Can you put EMI order in so I can take a look at the schedule, please? She lives 1 hr 22 mins away so this may be tricky. I can try to get her CTA with her covid tomorrow but unsure if tomorrow will have any CTA's available. I can offer her to go closer to home if not and bring us the disc for CTA. EMI will have to be Monday if there is any availability and she willing to be covid tested tomorrow.

## 2021-04-08 NOTE — TELEPHONE ENCOUNTER
I dont think so. Were going back and forth in staff messages trying to figure out her scheduling. She lives far but she needs to be covid tested prior to EMI so its hard. Patient is in contact with Shivani so I dont think there is a need to call. TY!

## 2021-04-08 NOTE — PROGRESS NOTES
Several calls placed back to pt to discuss appointment dates for EMI.  Pt reluctant to come down so early in the am d/t her distance from the facility.  She states she will have to leave the house at 5 in the morning to make the EMI appt as 1st case. She is worried about a ride, work traffic, and commuting that early in the am.      Have kept Floresita Lawler and Stuart Prajapati in the loop with pt's concerns.

## 2021-04-09 ENCOUNTER — TELEPHONE (OUTPATIENT)
Dept: CARDIOTHORACIC SURGERY | Facility: CLINIC | Age: 86
End: 2021-04-09

## 2021-04-09 NOTE — TELEPHONE ENCOUNTER
Spoke to the pt and spouse several times via phone this morning regarding upcoming appt for EMI and Dr White, as well as covid test prior to EMI.    appt for 4/27/2021 at 0815 EMI, followed by Christopher at 0940.  Covid test will be at [address] [id number].      Messaged EDIS Prajapati and EDIS Miguel to let them know the pt will need a script sent to the [facility] and provided the address.      Last phone call I left a message to please call me with any questions when I relayed their appointment information.

## 2021-04-15 ENCOUNTER — TELEPHONE (OUTPATIENT)
Dept: CARDIOTHORACIC SURGERY | Facility: CLINIC | Age: 86
End: 2021-04-15

## 2021-04-15 NOTE — TELEPHONE ENCOUNTER
Called pt to let her know appointments on Apr 27 were switched around.  She will see Dr White at 840 am and will have her EMI afterwards.   I let her know I would see her when she goes in to see Dr White.

## 2021-04-16 ENCOUNTER — TELEPHONE (OUTPATIENT)
Dept: CARDIOTHORACIC SURGERY | Facility: CLINIC | Age: 86
End: 2021-04-16

## 2021-04-16 DIAGNOSIS — I34.0 NONRHEUMATIC MITRAL VALVE REGURGITATION: Primary | ICD-10-CM

## 2021-04-16 DIAGNOSIS — Z11.59 SCREENING FOR VIRAL DISEASE: ICD-10-CM

## 2021-04-22 ENCOUNTER — TELEPHONE (OUTPATIENT)
Dept: CARDIOTHORACIC SURGERY | Facility: CLINIC | Age: 86
End: 2021-04-22

## 2021-04-22 NOTE — TELEPHONE ENCOUNTER
Pt went to [facility] in [place] and they did not have her script for blood work or the covid test.  Pt is currently at an urgent care getting a covid test for EMI prep.  I let her know not to worry about the labs.  We can do them when she is in to visit Dr White before her EMI.

## 2021-04-22 NOTE — TELEPHONE ENCOUNTER
Patient called and said [facility] did not receive my fax even with 3 completed faxes. The woman I spoke to at [facility] gave me her personal fax of  and fax was completed.

## 2021-04-23 ENCOUNTER — TELEPHONE (OUTPATIENT)
Dept: CARDIOLOGY | Facility: HOSPITAL | Age: 86
End: 2021-04-23

## 2021-04-23 NOTE — TELEPHONE ENCOUNTER
Left message for Floresita Peterson re: COVID results for patient. Checking if patient is bringing a copy of negative result report with her on Tuesday or if your group is faxing us the results prior to Tuesday morning. Her EMI is on 4/27 at 1000. Thanks in advance.

## 2021-04-26 ENCOUNTER — TELEPHONE (OUTPATIENT)
Dept: CARDIOLOGY | Facility: HOSPITAL | Age: 86
End: 2021-04-26

## 2021-04-26 NOTE — PROGRESS NOTES
Interventional Cardiology/Structural Heart Program    Reason for Consultation: Mitral Regurgitation.   Referring Provider:    NELSY  Carlos A Ziegler is a 87 y.o. female  who presents today for a 2nd opinion evaluation for a HIPOLITO of mitral valve for mitral regurgation.  Mitral regurgitatoin was found during her colon cancer surgery in 2016 (s/p chemo and resection).  She was referred by Dr. Whyte.  She was seen by Dr. Porfirio Garcia in July 2019 and was offered surgery to repair the valve with a robot but this was cancelled d/t severe arterial disease and inability to do minimally invasive approach.  She was then seen here  for a second opinion by Dr. Rice on 3/23/2021, it was recommend to have a transcatheter mitral valve repair with a Mitral clip procedure.  She had a EMI prior to that planned procedure and her mitral regurgitation was found to be moderate.  Her procedure was then postponed as she was asymptomatic on no medications or atrial fibrillation.     Patient complains of fatigue.  Associated symptoms of LE edema.  Patient rates symptoms as severe.        She currently denies SOB, PHELAN, chest pain, dizziness, orthopnea. She currently is limited to activity due to this pain.   Her biggest complaint is fatigue.  She relates this to her advanced age.  Her  is here today and states he has never seen her SOB.      PMH includes seizure disorder, CVA 2001, cerebral hemmorage in 2001, TIA 2016, and small bowel obstruction 2018.      PCP:  Dany Sullivan  Cardiologist: Dr. Aaron Whyte        Past Medical History:   Diagnosis Date   • Colon cancer (CMS/HCC)    • History of cerebral hemorrhage 12/4/2019 2001   • History of cerebrovascular accident 12/4/2019 2001   • History of colon cancer 12/4/2019   • History of small bowel obstruction 12/4/2019   • Mitral regurgitation 12/4/2019   • PAD (peripheral artery disease) (CMS/HCC) 12/4/2019   • Seizures (CMS/HCC)    • Stroke (cerebrum) (CMS/HCC)    • TIA  (transient ischemic attack) 2019          Past Surgical History:   Procedure Laterality Date   • COLON SURGERY  2016   • COLON SURGERY  2016        Allergies:  Allergies   Allergen Reactions   • Sulfamethoxazole-Trimethoprim Anaphylaxis   • Sulfa (Sulfonamide Antibiotics) Other (see comments)     Other reaction(s): swelling of hands and throat (moderate)   • Trimethoprim Other (see comments)     Other reaction(s): swelling of hands and throat (moderate)   • Penicillins Hives     Other reaction(s): hives          Current Medications:   Current Outpatient Medications   Medication Sig Dispense Refill   • biotin 10,000 mcg capsule Take 10,000 mcg by mouth daily.     • divalproex (DEPAKOTE) 125 mg EC tablet Take 125 mg by mouth daily.     • divalproex (DEPAKOTE) 250 mg EC tablet Take 250 mg by mouth 2 (two) times a day.     • LIPITOR 10 mg tablet Take 1 tablet by mouth daily.     • metoprolol succinate XL (TOPROL XL) 25 mg 24 hr tablet Take 1 tablet (25 mg total) by mouth daily. 30 tablet 3     No current facility-administered medications for this visit.          Social History     Socioeconomic History   • Marital status:      Spouse name: Not on file   • Number of children: Not on file   • Years of education: Not on file   • Highest education level: Not on file   Occupational History   • Not on file   Tobacco Use   • Smoking status: Former Smoker     Packs/day: 1.00     Years: 20.00     Pack years: 20.00     Quit date:      Years since quittin.3   • Smokeless tobacco: Never Used   Vaping Use   • Vaping Use: Never used   Substance and Sexual Activity   • Alcohol use: Yes     Alcohol/week: 2.0 - 4.0 standard drinks     Types: 2 - 4 Glasses of wine per week     Comment: 2-4 weekly   • Drug use: Never   • Sexual activity: Defer   Other Topics Concern   • Not on file   Social History Narrative   • Not on file     Social Determinants of Health     Financial Resource Strain:    • Difficulty  "of Paying Living Expenses:    Food Insecurity:    • Worried About Running Out of Food in the Last Year:    • Ran Out of Food in the Last Year:    Transportation Needs:    • Lack of Transportation (Medical):    • Lack of Transportation (Non-Medical):    Physical Activity:    • Days of Exercise per Week:    • Minutes of Exercise per Session:    Stress:    • Feeling of Stress :    Social Connections:    • Frequency of Communication with Friends and Family:    • Frequency of Social Gatherings with Friends and Family:    • Attends Islam Services:    • Active Member of Clubs or Organizations:    • Attends Club or Organization Meetings:    • Marital Status:    Intimate Partner Violence:    • Fear of Current or Ex-Partner:    • Emotionally Abused:    • Physically Abused:    • Sexually Abused:         Family History:  Family History   Problem Relation Age of Onset   • Pancreatic cancer Biological Mother    • Cancer Biological Father         A 14-point review of system was performed and was negative, or as documented above. 10 systems examined.      Physical Exam     Visit Vitals  BP (!) 152/90 (BP Location: Left upper arm, Patient Position: Sitting)   Pulse 94   Ht 1.575 m (5' 2\")   Wt 65 kg (143 lb 4.8 oz)   SpO2 96%   BMI 26.21 kg/m²       General: WDWN, No acute distress  HEENT: EOMI.  PERRLA.  Nares patent.  Mucous membranes moist. No xanthelasma.  Neck: No jugular vein distention.  Carotids are equal with no audible bruits.  No lymphadenopathy or thyromegaly.  Heart: Regular rate and rhythm.  Normal S1-S2. Systolic murmur present with grade 3/6  Lungs: Clear to auscultation bilaterally.  No rales or wheezes.  Abdomen: Soft and nontender.  Bowel sounds are present.   Extremities: No cyanosis clubbing or edema.  Distal pulses are intact. Bilateral LE edema.    Skin: Warm, dry and well perfused.  Psych: Normal mood and judgment.  Neurologic: Alert and oriented ×3.       Review of Studies:  see CT Surgery note dated " 3/23/2021 for full review.    TTE 6/29/2021    The risks of the procedure were discussed with the patient and informed consent was obtained. The patient was sedated with 4 mg IV Versed and 50  mcg IV Fentanyl. The EMI probe was inserted without difficulty and the patient tolerated the procedure well.     1. Normal left ventricular wall thickness and cavity with preserved systolic function. Estimated EF 60 - 65 %. No regional wall motion abnormalities.   2. Three cusped aortic valve. Mild aortic regurgitation. Normal aortic dimensions. Simple atheroma in the visualized portions of the descending aorta without complex elements.   3. Moderate mitral regurgitation with eccentric anteriorly directed jet due to small flail segment of P2 with ruptured chordae. Pisa radius 0.7cm, EROA 0.19 cm2.  Mean transmitral gradient 2mmHg. Normal pulmonary vein flow by doppler.   4. Normal left atrial size. No thrombus or mass seen in the left atrium or left atrial appendage.   5. Lipomatous hypertrophy of the interatrial septum. No shunt across.   6. Normal right ventricular size with preserved systolic function. Normal right atrial size.    7. Structurally normal tricuspid valve. Trace tricuspid regurgitation. Estimated right ventricular systolic pressure of 20 mmHg + RAP.  8. Pulmonic valve not well visualized.   9. No pericardial effusion.   10. No previous study for direct comparison.        TTE 3/23/2021  · Mild left ventricular dilatation.  Normal wall thickness and preserved systolic function. Estimated EF 65%. No regional wall motion abnormalities.Aortic valve is tricuspid. Mild aortic insufficiency. No aortic stenosis. Normal size of aortic root. Ascending aorta is not well visualized.  · Sclerotic mitral leaflets with bileaflet mitral prolapse. Prior EMI on 6/29/20 showed flail P2 segment due to ruptured chordae.  There is severe mitral insufficiency.  There are 2 eccentric jets directed both anteriorly and posteriorly with  the larger jet being posterior.  · The left atrium is moderately dilated.  · Right ventricle is normal-sized with preserved systolic function.   · Right atrium is normal-sized.   · Mild tricuspid regurgitation. Estimated right ventricular systolic pressure of 50 mmHg.   · Pulmonic valve not well visualized. Trace pulmonic regurgitation.   · IVC measures smaller than 2.1 cm and collapses more than 50% with respiration consistent with estimated right atrial pressure of 3 mmHg.  · No pericardial effusion.  · Compared to prior TTE from outside hospital on 4/26/2019, no significant change.  Compared to the EMI of 6/29/2020, the posteriorly directed jet appears new and likely reflects more leaflet disease..        Assessment/Plan       Mitral Insufficiency    Outside Echoes reviewed by Dr. Dr. Rice and Dr. White.  Severe mitral regurgitation with posterior prolapse. Degenerative MR. Tricuspid regurgitation, Ef 65.  Symptoms not medically managed with diuretics.  [facility] I. No chest discomfort and No syncopal or presyncopal episodes. Patient not on any medical therapy.    Plan:  STS risk for surgical intervention/repair is 2.9%.  Patient is frail and failed frailty exam.  Recommend a transcatheter mitral valve repair with a Mitral clasp- procedure.        Prior to surgery she will need to be started on antihypertensives.  Will start her on Toprol 25mg daily.  She will need to folllow with her PCP or cardiologist closer to home to monitor and increase her medications if needed.       Will need a  CTA chest abdomen and pelvis- disc that which was done at [facility].  Will also need Carotid ultrasound, 6 min walk, dental evaluation( given form) and PAT's prior to surgery.  She is going for an echo today.        Surgery date of 5/19/21.       Will NOT need to stop any current medications before surgery. The mupirocin prescription will be sent once a surgery date set.  It will be sent to the pharmacy and will begin 5 days  in advance of surgery as ordered.       I have discussed with the patient and the family the rationale for the procedure as well as possible alternatives.  We discussed potential risks and complications associated with this procedure.  The risks include, but are not limited to: bleeding, infection, arrhythmias, myocardial infarction, stroke, death, renal as well as pulmonary insufficiency and the possibility of blood transfusions,  permanent pacemaker placement intra-aortic balloon placement,  assist device placement, cardiac wire perforation, and requirement of CPB.  Time was spent educating the patient and family about their heart disease diagnosis and treatment options. All questions and concerns were addressed.The patient and family understand and agree to proceed.       IChristal PA-C am scribing for and in the presence of Dr. White.    4/26/2021     Seen with CELESTE Joy and agree with details of note and plans.  Discussed at length with patient.

## 2021-04-26 NOTE — TELEPHONE ENCOUNTER
Called and spoke with patient. Provided EMI instructions for 1000 EMI on 4/27. Questions answered. Patient has outpatient appt. With Dr. White prior to EMI will come right from his office.

## 2021-04-27 ENCOUNTER — HOSPITAL ENCOUNTER (OUTPATIENT)
Dept: CARDIOLOGY | Facility: HOSPITAL | Age: 86
Discharge: HOME | End: 2021-04-27
Attending: NURSE PRACTITIONER
Payer: MEDICARE

## 2021-04-27 ENCOUNTER — OFFICE VISIT (OUTPATIENT)
Dept: CARDIOLOGY | Facility: CLINIC | Age: 86
End: 2021-04-27
Payer: MEDICARE

## 2021-04-27 VITALS
OXYGEN SATURATION: 96 % | HEIGHT: 62 IN | RESPIRATION RATE: 12 BRPM | SYSTOLIC BLOOD PRESSURE: 188 MMHG | WEIGHT: 147 LBS | HEART RATE: 62 BPM | BODY MASS INDEX: 27.05 KG/M2 | DIASTOLIC BLOOD PRESSURE: 91 MMHG

## 2021-04-27 VITALS
DIASTOLIC BLOOD PRESSURE: 90 MMHG | SYSTOLIC BLOOD PRESSURE: 152 MMHG | HEIGHT: 62 IN | BODY MASS INDEX: 26.37 KG/M2 | WEIGHT: 143.3 LBS | OXYGEN SATURATION: 96 % | HEART RATE: 94 BPM

## 2021-04-27 DIAGNOSIS — I07.1 SEVERE TRICUSPID REGURGITATION: Primary | ICD-10-CM

## 2021-04-27 DIAGNOSIS — I34.0 MITRAL VALVE INSUFFICIENCY, UNSPECIFIED ETIOLOGY: ICD-10-CM

## 2021-04-27 LAB — BSA FOR ECHO PROCEDURE: 1.71 M2

## 2021-04-27 PROCEDURE — 25800000 HC PHARMACY IV SOLUTIONS: Performed by: STUDENT IN AN ORGANIZED HEALTH CARE EDUCATION/TRAINING PROGRAM

## 2021-04-27 PROCEDURE — 93312 ECHO TRANSESOPHAGEAL: CPT | Mod: 26,GA | Performed by: INTERNAL MEDICINE

## 2021-04-27 PROCEDURE — 63600000 HC DRUGS/DETAIL CODE: Performed by: STUDENT IN AN ORGANIZED HEALTH CARE EDUCATION/TRAINING PROGRAM

## 2021-04-27 PROCEDURE — 93312 ECHO TRANSESOPHAGEAL: CPT | Mod: GA

## 2021-04-27 PROCEDURE — 99205 OFFICE O/P NEW HI 60 MIN: CPT | Performed by: INTERNAL MEDICINE

## 2021-04-27 PROCEDURE — 76376 3D RENDER W/INTRP POSTPROCES: CPT | Mod: 26,GA | Performed by: INTERNAL MEDICINE

## 2021-04-27 PROCEDURE — 25000000 HC PHARMACY GENERAL: Performed by: STUDENT IN AN ORGANIZED HEALTH CARE EDUCATION/TRAINING PROGRAM

## 2021-04-27 RX ORDER — LIDOCAINE HYDROCHLORIDE 40 MG/ML
SOLUTION TOPICAL
Status: COMPLETED | OUTPATIENT
Start: 2021-04-27 | End: 2021-04-27

## 2021-04-27 RX ORDER — LIDOCAINE HYDROCHLORIDE 20 MG/ML
SOLUTION OROPHARYNGEAL
Status: COMPLETED | OUTPATIENT
Start: 2021-04-27 | End: 2021-04-27

## 2021-04-27 RX ORDER — FENTANYL CITRATE 50 UG/ML
INJECTION, SOLUTION INTRAMUSCULAR; INTRAVENOUS
Status: COMPLETED | OUTPATIENT
Start: 2021-04-27 | End: 2021-04-27

## 2021-04-27 RX ORDER — MIDAZOLAM HYDROCHLORIDE 2 MG/2ML
INJECTION, SOLUTION INTRAMUSCULAR; INTRAVENOUS
Status: COMPLETED | OUTPATIENT
Start: 2021-04-27 | End: 2021-04-27

## 2021-04-27 RX ORDER — SODIUM CHLORIDE 9 MG/ML
INJECTION, SOLUTION INTRAVENOUS
Status: COMPLETED | OUTPATIENT
Start: 2021-04-27 | End: 2021-04-27

## 2021-04-27 RX ADMIN — LIDOCAINE HYDROCHLORIDE 5 ML: 40 SOLUTION TOPICAL at 10:50

## 2021-04-27 RX ADMIN — LIDOCAINE HYDROCHLORIDE 10 ML: 20 SOLUTION ORAL; TOPICAL at 11:17

## 2021-04-27 RX ADMIN — MIDAZOLAM HYDROCHLORIDE 1 MG: 1 INJECTION, SOLUTION INTRAMUSCULAR; INTRAVENOUS at 11:10

## 2021-04-27 RX ADMIN — FENTANYL CITRATE 25 MCG: 50 INJECTION, SOLUTION INTRAMUSCULAR; INTRAVENOUS at 11:14

## 2021-04-27 RX ADMIN — MIDAZOLAM HYDROCHLORIDE 1 MG: 1 INJECTION, SOLUTION INTRAMUSCULAR; INTRAVENOUS at 11:22

## 2021-04-27 RX ADMIN — MIDAZOLAM HYDROCHLORIDE 1 MG: 1 INJECTION, SOLUTION INTRAMUSCULAR; INTRAVENOUS at 11:14

## 2021-04-27 RX ADMIN — SODIUM CHLORIDE 10 ML/HR: 9 INJECTION, SOLUTION INTRAVENOUS at 10:40

## 2021-04-27 RX ADMIN — FENTANYL CITRATE 25 MCG: 50 INJECTION, SOLUTION INTRAMUSCULAR; INTRAVENOUS at 11:22

## 2021-04-27 RX ADMIN — FENTANYL CITRATE 25 MCG: 50 INJECTION, SOLUTION INTRAMUSCULAR; INTRAVENOUS at 11:10

## 2021-04-27 NOTE — NURSING NOTE
Patient and  given discharge instructions. Patient escorted to car by RN for discharge.   Implemented All Universal Safety Interventions:  [place] to call system. Call bell, personal items and telephone within reach. Instruct patient to call for assistance. Room bathroom lighting operational. Non-slip footwear when patient is off stretcher. Physically safe environment: no spills, clutter or unnecessary equipment. Stretcher in lowest position, wheels locked, appropriate side rails in place.

## 2021-04-27 NOTE — Clinical Note
Patient position: head up 30 degrees. Bite block inserted. EMI probe inserted via blind insertion technique. Probe inserted without difficulty. Probe insertion attempts: 1. EMI in progress.

## 2021-05-05 ENCOUNTER — TELEPHONE (OUTPATIENT)
Dept: CARDIOTHORACIC SURGERY | Facility: CLINIC | Age: 86
End: 2021-05-05

## 2021-05-05 DIAGNOSIS — I34.0 NONRHEUMATIC MITRAL VALVE REGURGITATION: Primary | ICD-10-CM

## 2021-05-05 NOTE — TELEPHONE ENCOUNTER
I spoke to patient at length about her ineligibility for participation in the clasp trial. This decision was made due to her mitral valve area being less than 4 cm.  She was accepting and understanding of this.  She is very hopeful that her new blood pressure medicine- 10 mg of lisinopril, is able to make her blood pressure better and there for her mitral regurgitation less than severe.  She would like to explore this option with close monitoring by her cardiologist/CRNP team.  As we left it she will keep in touch.  The plan will be to come back in late May early June for a repeat transthoracic echocardiogram and a CTA [place] all on 1 day in conjunction with a visit with Dr. Rice.  This will help us identify whether or not she is a candidate for the [employer] trial as well as whether or not her mitral regurgitation has improved with adequate blood pressure control.  I have discussed all of this with Shivani Hernandez the research coordinator and we will follow up with the patient to make arrangements for the above plan

## 2021-05-10 DIAGNOSIS — I34.0 NONRHEUMATIC MITRAL VALVE REGURGITATION: ICD-10-CM

## 2021-05-10 DIAGNOSIS — Z11.59 SCREENING FOR VIRAL DISEASE: ICD-10-CM

## 2024-03-04 ENCOUNTER — ANESTHESIA EVENT (OUTPATIENT)
Dept: PERIOP | Facility: HOSPITAL | Age: 89
End: 2024-03-04
Payer: MEDICARE

## 2024-03-06 ENCOUNTER — HOSPITAL ENCOUNTER (OUTPATIENT)
Facility: HOSPITAL | Age: 89
Setting detail: OUTPATIENT SURGERY
Discharge: HOME | End: 2024-03-06
Attending: PLASTIC SURGERY | Admitting: PLASTIC SURGERY
Payer: MEDICARE

## 2024-03-06 ENCOUNTER — ANESTHESIA (OUTPATIENT)
Dept: PERIOP | Facility: HOSPITAL | Age: 89
End: 2024-03-06
Payer: MEDICARE

## 2024-03-06 VITALS
TEMPERATURE: 97 F | OXYGEN SATURATION: 98 % | SYSTOLIC BLOOD PRESSURE: 172 MMHG | DIASTOLIC BLOOD PRESSURE: 79 MMHG | WEIGHT: 136 LBS | RESPIRATION RATE: 18 BRPM | HEIGHT: 62 IN | HEART RATE: 70 BPM | BODY MASS INDEX: 25.03 KG/M2

## 2024-03-06 PROBLEM — I10 HTN (HYPERTENSION): Status: ACTIVE | Noted: 2024-03-06

## 2024-03-06 PROBLEM — I63.9 CVA (CEREBRAL VASCULAR ACCIDENT) (HCC): Status: ACTIVE | Noted: 2024-03-06

## 2024-03-06 PROBLEM — R56.9 SEIZURES (HCC): Status: ACTIVE | Noted: 2024-03-06

## 2024-03-06 PROBLEM — E78.5 HYPERLIPIDEMIA: Status: ACTIVE | Noted: 2024-03-06

## 2024-03-06 PROBLEM — I34.1 MITRAL VALVE PROLAPSE: Status: ACTIVE | Noted: 2024-03-06

## 2024-03-06 RX ORDER — DIVALPROEX SODIUM 125 MG/1
125 TABLET, DELAYED RELEASE ORAL DAILY
COMMUNITY
Start: 2023-10-06

## 2024-03-06 RX ORDER — MAGNESIUM HYDROXIDE 1200 MG/15ML
LIQUID ORAL AS NEEDED
Status: DISCONTINUED | OUTPATIENT
Start: 2024-03-06 | End: 2024-03-06 | Stop reason: HOSPADM

## 2024-03-06 RX ORDER — LISINOPRIL 10 MG/1
10 TABLET ORAL 2 TIMES DAILY
COMMUNITY

## 2024-03-06 RX ORDER — SODIUM CHLORIDE, SODIUM LACTATE, POTASSIUM CHLORIDE, CALCIUM CHLORIDE 600; 310; 30; 20 MG/100ML; MG/100ML; MG/100ML; MG/100ML
30 INJECTION, SOLUTION INTRAVENOUS CONTINUOUS
Status: DISCONTINUED | OUTPATIENT
Start: 2024-03-06 | End: 2024-03-06 | Stop reason: HOSPADM

## 2024-03-06 RX ORDER — CEFAZOLIN SODIUM 1 G/50ML
1000 SOLUTION INTRAVENOUS ONCE
Status: COMPLETED | OUTPATIENT
Start: 2024-03-06 | End: 2024-03-06

## 2024-03-06 RX ORDER — LIDOCAINE HYDROCHLORIDE AND EPINEPHRINE 5; 5 MG/ML; UG/ML
INJECTION, SOLUTION INFILTRATION; PERINEURAL AS NEEDED
Status: DISCONTINUED | OUTPATIENT
Start: 2024-03-06 | End: 2024-03-06 | Stop reason: HOSPADM

## 2024-03-06 RX ORDER — FENTANYL CITRATE 50 UG/ML
INJECTION, SOLUTION INTRAMUSCULAR; INTRAVENOUS AS NEEDED
Status: DISCONTINUED | OUTPATIENT
Start: 2024-03-06 | End: 2024-03-06

## 2024-03-06 RX ORDER — PROPOFOL 10 MG/ML
INJECTION, EMULSION INTRAVENOUS AS NEEDED
Status: DISCONTINUED | OUTPATIENT
Start: 2024-03-06 | End: 2024-03-06

## 2024-03-06 RX ORDER — MINERAL OIL
OIL (ML) MISCELLANEOUS AS NEEDED
Status: DISCONTINUED | OUTPATIENT
Start: 2024-03-06 | End: 2024-03-06 | Stop reason: HOSPADM

## 2024-03-06 RX ORDER — SODIUM CHLORIDE, SODIUM LACTATE, POTASSIUM CHLORIDE, CALCIUM CHLORIDE 600; 310; 30; 20 MG/100ML; MG/100ML; MG/100ML; MG/100ML
75 INJECTION, SOLUTION INTRAVENOUS CONTINUOUS
Status: DISCONTINUED | OUTPATIENT
Start: 2024-03-06 | End: 2024-03-06

## 2024-03-06 RX ORDER — ONDANSETRON 4 MG/1
4 TABLET, ORALLY DISINTEGRATING ORAL EVERY 6 HOURS PRN
Status: DISCONTINUED | OUTPATIENT
Start: 2024-03-06 | End: 2024-03-06 | Stop reason: HOSPADM

## 2024-03-06 RX ORDER — ATORVASTATIN CALCIUM 20 MG/1
20 TABLET, FILM COATED ORAL DAILY
COMMUNITY

## 2024-03-06 RX ORDER — ACETAMINOPHEN 160 MG/5ML
320 SUSPENSION ORAL EVERY 6 HOURS PRN
Status: DISCONTINUED | OUTPATIENT
Start: 2024-03-06 | End: 2024-03-06 | Stop reason: HOSPADM

## 2024-03-06 RX ORDER — DIVALPROEX SODIUM 250 MG/1
250 TABLET, DELAYED RELEASE ORAL 2 TIMES DAILY
COMMUNITY

## 2024-03-06 RX ORDER — VANCOMYCIN HYDROCHLORIDE 1 G/200ML
15 INJECTION, SOLUTION INTRAVENOUS ONCE
Status: COMPLETED | OUTPATIENT
Start: 2024-03-06 | End: 2024-03-06

## 2024-03-06 RX ORDER — ONDANSETRON 2 MG/ML
4 INJECTION INTRAMUSCULAR; INTRAVENOUS ONCE AS NEEDED
Status: DISCONTINUED | OUTPATIENT
Start: 2024-03-06 | End: 2024-03-06 | Stop reason: HOSPADM

## 2024-03-06 RX ADMIN — FENTANYL CITRATE 25 MCG: 50 INJECTION, SOLUTION INTRAMUSCULAR; INTRAVENOUS at 09:23

## 2024-03-06 RX ADMIN — PROPOFOL 10 MG: 10 INJECTION, EMULSION INTRAVENOUS at 09:23

## 2024-03-06 RX ADMIN — PROPOFOL 10 MG: 10 INJECTION, EMULSION INTRAVENOUS at 09:37

## 2024-03-06 RX ADMIN — PROPOFOL 10 MG: 10 INJECTION, EMULSION INTRAVENOUS at 09:52

## 2024-03-06 RX ADMIN — CEFAZOLIN SODIUM 1000 MG: 1 SOLUTION INTRAVENOUS at 09:46

## 2024-03-06 RX ADMIN — PROPOFOL 10 MG: 10 INJECTION, EMULSION INTRAVENOUS at 09:25

## 2024-03-06 RX ADMIN — PROPOFOL 10 MG: 10 INJECTION, EMULSION INTRAVENOUS at 09:46

## 2024-03-06 RX ADMIN — PROPOFOL 10 MG: 10 INJECTION, EMULSION INTRAVENOUS at 09:29

## 2024-03-06 RX ADMIN — VANCOMYCIN HYDROCHLORIDE 1000 MG: 1 INJECTION, SOLUTION INTRAVENOUS at 09:03

## 2024-03-06 RX ADMIN — PROPOFOL 10 MG: 10 INJECTION, EMULSION INTRAVENOUS at 09:33

## 2024-03-06 RX ADMIN — FENTANYL CITRATE 25 MCG: 50 INJECTION, SOLUTION INTRAMUSCULAR; INTRAVENOUS at 09:51

## 2024-03-06 RX ADMIN — FENTANYL CITRATE 25 MCG: 50 INJECTION, SOLUTION INTRAMUSCULAR; INTRAVENOUS at 09:27

## 2024-03-06 RX ADMIN — PROPOFOL 10 MG: 10 INJECTION, EMULSION INTRAVENOUS at 09:35

## 2024-03-06 RX ADMIN — FENTANYL CITRATE 25 MCG: 50 INJECTION, SOLUTION INTRAMUSCULAR; INTRAVENOUS at 09:32

## 2024-03-06 RX ADMIN — SODIUM CHLORIDE, SODIUM LACTATE, POTASSIUM CHLORIDE, AND CALCIUM CHLORIDE: .6; .31; .03; .02 INJECTION, SOLUTION INTRAVENOUS at 09:17

## 2024-03-06 NOTE — ANESTHESIA PREPROCEDURE EVALUATION
Procedure:  SKIN GRAFT LEFT LEG (Left: Leg)    Relevant Problems   CARDIO   (+) HTN (hypertension)   (+) Hyperlipidemia   (+) Mitral valve prolapse      NEURO/PSYCH   (+) CVA (cerebral vascular accident) (HCC)   (+) Seizures (HCC)      MICHELLE 4/27/21:     The risks of the procedure were discussed with the patient and informed   consent was obtained. The patient was sedated with 3 mg IV Midazolam and   75 mcg IV Fentanyl. The MICHELLE probe was inserted without difficulty and the   patient tolerated the procedure well.   · The left ventricle is normal in size. There is normal wall thickness.   There is normal left ventricular systolic function. Left ventricular   ejection fraction measures 64% by 3D echocardiography. There is normal   wall motion. Diastolic parameters were not quantified due to the presence   of significant mitral regurgitation.   · The left atrium is moderately dilated. There is no thrombus or mass seen   in the left atrium or left atrial appendage. Left atrial appendage peak   emptying velocity is decreased at 32 cm/s consistent with stasis. There is   no evidence of a patent foramen ovale by color Doppler.   · The mitral valve is mildly thickened. There is mild posterior mitral   annular calcification. There are scattered foci of calcium on both mitral   leaflets. There is mild calcification of the subvalvular apparatus. There   is a small flail segment of the medial aspect of the P2 scallop with   associated ruptured chordae. The flail gap measures 4 mm. There is   moderate to severe (3+) eccentric, anteriorly directed mitral   regurgitation. There is no mitral stenosis. The mean transmitral gradient   measures 2 mmHg at a heart rate of 53 beats/minute. The mitral valve area   by 3D planimetry measures 3.7 cm². The height from the mitral annulus to   the fossa ovalis plane measures approximately 3 cm. There is systolic   predominance of the pulmonary vein Doppler profile with the exception of   systolic  blunting in the right upper pulmonary vein.   · The aortic valve has three cusps. There is mild focal calcification.   There is no aortic stenosis. There is mild aortic regurgitation.   · The aortic root is normal in size. The aortic root is mildly calcified.   The ascending aorta is normal in size. There is simple atheroma in the   visualized portions of the descending thoracic aorta without complex   elements.   · The right ventricle is normal in size. There is normal right ventricular   systolic function.   · The right atrium is normal in size. There is a small mobile Eustachian   valve.   · The tricuspid valve is normal. There is mild tricuspid regurgitation.   The estimated right ventricular systolic pressure = 33 mmHg plus right   atrial pressure.   · The pulmonic valve is normal. There is physiologic pulmonic   regurgitation.   · There is no pericardial effusion.     Compared to the previous MICHELLE on 6/29/21, the severity of mitral   regurgitation has progressed from 2+ 3+.   Physical Exam    Airway    Mallampati score: III  TM Distance: >3 FB  Neck ROM: full     Dental   No notable dental hx implants    Cardiovascular      Pulmonary      Other Findings  post-pubertal.      Anesthesia Plan  ASA Score- 3     Anesthesia Type- IV sedation with anesthesia with ASA Monitors.         Additional Monitors:     Airway Plan:            Plan Factors-Exercise tolerance (METS): >4 METS.    Chart reviewed. EKG reviewed.  Existing labs reviewed. Patient summary reviewed.    Patient is not a current smoker.  Patient did not smoke on day of surgery.            Induction- intravenous.    Postoperative Plan- Plan for postoperative opioid use.     Informed Consent- Anesthetic plan and risks discussed with patient and spouse.  I personally reviewed this patient with the CRNA. Discussed and agreed on the Anesthesia Plan with the CRNA..

## 2024-03-06 NOTE — INTERVAL H&P NOTE
H&P reviewed. After examining the patient I find no changes in the patients condition since the H&P had been written.    Vitals:    03/06/24 0745   BP: (!) 179/86   Pulse: 92   Resp: 18   Temp: (!) 96.3 °F (35.7 °C)   SpO2: 98%

## 2024-03-06 NOTE — ANESTHESIA POSTPROCEDURE EVALUATION
Post-Op Assessment Note    CV Status:  Stable    Pain management: adequate       Mental Status:  Alert and awake   Hydration Status:  Euvolemic   PONV Controlled:  Controlled   Airway Patency:  Patent     Post Op Vitals Reviewed: Yes    No anethesia notable event occurred.    Staff: CRNA               BP (!) 182/84 (03/06/24 1005)    Temp 98 °F (36.7 °C) (03/06/24 1005)    Pulse 78 (03/06/24 1005)   Resp 16 (03/06/24 1005)    SpO2 98 % (03/06/24 1005)

## 2024-03-06 NOTE — NURSING NOTE
Pt in no apparent distress. Tolerated po intake. AVS instructions given; pt verbalized understanding. IV removed.

## 2024-03-06 NOTE — OP NOTE
OPERATIVE REPORT  PATIENT NAME: Jason Calderon    :  1933  MRN: [id number]  Pt Location:  OR ROOM 08    SURGERY DATE: 3/6/2024    Surgeons and Role:     * Juan Sexton MD - Primary    Preop and postoperative diagnosis: Skin avulsion left distal pretibial    Procedure(s):  Left - SKIN GRAFT LEFT LEG    Operative history: She sustained a laceration and skin avulsion of her left distal pretibial area.  The skin eschar has been serial debrided and she returns at this time to have this 2 x 4 cm open granulating wound skin grafted.  The graft was of size equal to the wound itself.    Operative procedure: The patient was taken to the operating room and placed supine on the operating table.  She was prepped and draped in usual fashion.  Anesthesia was general supplemented with Xylocaine 1% with epinephrine.  The wound of the left lower leg was then scraped of debris from its surface.  Next a patellectomy dermatome was used to harvest a 13 1007 and split-thickness skin graft from the left anterior thigh.  The graft was meshed 1/2-1 and placed on the left leg defect with staples for fixation.  Remaining graft was returned to the donor site where the tube was stapled into position with the intent to hasten healing of that donor site.  Light dressings were placed on the left thigh.  A bulky wrap was placed on the left leg.  Patient tolerated this procedure well was taken to the recovery area in good condition.    SIGNATURE: Juan Sexton MD  DATE: 2024  TIME: 10:07 AM

## 2024-07-08 ENCOUNTER — ANESTHESIA EVENT (OUTPATIENT)
Dept: PERIOP | Facility: HOSPITAL | Age: 89
End: 2024-07-08
Payer: MEDICARE

## 2024-07-08 NOTE — PRE-PROCEDURE INSTRUCTIONS
Pre-Surgery Instructions:   Medication Instructions    atorvastatin (LIPITOR) 20 mg tablet Take night before surgery    divalproex sodium (DEPAKOTE) 125 mg DR tablet Take day of surgery.    divalproex sodium (DEPAKOTE) 250 mg DR tablet Take day of surgery.    lisinopril (ZESTRIL) 10 mg tablet Hold day of surgery.    Medication instructions for day surgery reviewed. Please use only a sip of water to take your instructed medications. Avoid all over the counter vitamins, supplements and NSAIDS for one week prior to surgery per anesthesia guidelines. Tylenol is ok to take as needed.     You will receive a call one business day prior to surgery with an arrival time and hospital directions. If your surgery is scheduled on a Monday, the hospital will be calling you on the Friday prior to your surgery. If you have not heard from anyone by 8pm, please call the hospital supervisor through the hospital  at [phone]. ([place] [phone] or [place] [phone]).    Do not eat or drink anything after midnight the night before your surgery, including candy, mints, lifesavers, or chewing gum. Do not drink alcohol 24hrs before your surgery. Try not to smoke at least 24hrs before your surgery.       Follow the pre surgery showering instructions as listed in the “My Surgical Experience Booklet” or otherwise provided by your surgeon's office. Do not use a blade to shave the surgical area 1 week before surgery. It is okay to use a clean electric clippers up to 24 hours before surgery. Do not apply any lotions, creams, including makeup, cologne, deodorant, or perfumes after showering on the day of your surgery. Do not use dry shampoo, hair spray, hair gel, or any type of hair products.     No contact lenses, eye make-up, or artificial eyelashes. Remove nail polish, including gel polish, and any artificial, gel, or acrylic nails if possible. Remove all jewelry including rings and body piercing jewelry.     Wear causal  clothing that is easy to take on and off. Consider your type of surgery.    Keep any valuables, jewelry, piercings at home. Please bring any specially ordered equipment (sling, braces) if indicated.    Arrange for a responsible person to drive you to and from the hospital on the day of your surgery. Please confirm the visitor policy for the day of your procedure when you receive your phone call with an arrival time.     Call the surgeon's office with any new illnesses, exposures, or additional questions prior to surgery.    Please reference your “My Surgical Experience Booklet” for additional information to prepare for your upcoming surgery.

## 2024-07-09 NOTE — ANESTHESIA PREPROCEDURE EVALUATION
Procedure:  SKIN GRAFT TO LEG (Left: Leg)    Relevant Problems   CARDIO   (+) HTN (hypertension)   (+) Hyperlipidemia   (+) Mitral valve prolapse      NEURO/PSYCH   (+) CVA (cerebral vascular accident) (HCC)   (+) Seizures (HCC)        Physical Exam    Airway    Mallampati score: II  TM Distance: <3 FB  Neck ROM: full     Dental   No notable dental hx     Cardiovascular  Cardiovascular exam normal    Pulmonary  Pulmonary exam normal     Other Findings  post-pubertal.      Anesthesia Plan  ASA Score- 3     Anesthesia Type- IV sedation with anesthesia with ASA Monitors.         Additional Monitors:     Airway Plan: LMA.           Plan Factors-Exercise tolerance (METS): >4 METS.    Chart reviewed.  Imaging results reviewed. Existing labs reviewed. Patient summary reviewed.    Patient is not a current smoker. Patient not instructed to abstain from smoking on day of procedure. Patient did not smoke on day of surgery.            Induction- intravenous.    Postoperative Plan-         Informed Consent- Anesthetic plan and risks discussed with patient.  I personally reviewed this patient with the CRNA. Discussed and agreed on the Anesthesia Plan with the CRNA..

## 2024-07-10 ENCOUNTER — ANESTHESIA (OUTPATIENT)
Dept: PERIOP | Facility: HOSPITAL | Age: 89
End: 2024-07-10
Payer: MEDICARE

## 2024-07-10 ENCOUNTER — HOSPITAL ENCOUNTER (OUTPATIENT)
Facility: HOSPITAL | Age: 89
Setting detail: OUTPATIENT SURGERY
Discharge: HOME | End: 2024-07-10
Attending: PLASTIC SURGERY | Admitting: PLASTIC SURGERY
Payer: MEDICARE

## 2024-07-10 VITALS
RESPIRATION RATE: 18 BRPM | OXYGEN SATURATION: 100 % | HEART RATE: 64 BPM | DIASTOLIC BLOOD PRESSURE: 75 MMHG | SYSTOLIC BLOOD PRESSURE: 175 MMHG | TEMPERATURE: 97 F

## 2024-07-10 RX ORDER — MAGNESIUM HYDROXIDE 1200 MG/15ML
LIQUID ORAL AS NEEDED
Status: DISCONTINUED | OUTPATIENT
Start: 2024-07-10 | End: 2024-07-10 | Stop reason: HOSPADM

## 2024-07-10 RX ORDER — LIDOCAINE HYDROCHLORIDE AND EPINEPHRINE 5; 5 MG/ML; UG/ML
INJECTION, SOLUTION INFILTRATION; PERINEURAL AS NEEDED
Status: DISCONTINUED | OUTPATIENT
Start: 2024-07-10 | End: 2024-07-10 | Stop reason: HOSPADM

## 2024-07-10 RX ORDER — ONDANSETRON 2 MG/ML
4 INJECTION INTRAMUSCULAR; INTRAVENOUS ONCE AS NEEDED
Status: DISCONTINUED | OUTPATIENT
Start: 2024-07-10 | End: 2024-07-10 | Stop reason: HOSPADM

## 2024-07-10 RX ORDER — PHENYLEPHRINE HCL IN 0.9% NACL 1 MG/10 ML
SYRINGE (ML) INTRAVENOUS AS NEEDED
Status: DISCONTINUED | OUTPATIENT
Start: 2024-07-10 | End: 2024-07-10

## 2024-07-10 RX ORDER — SODIUM CHLORIDE, SODIUM LACTATE, POTASSIUM CHLORIDE, CALCIUM CHLORIDE 600; 310; 30; 20 MG/100ML; MG/100ML; MG/100ML; MG/100ML
125 INJECTION, SOLUTION INTRAVENOUS CONTINUOUS
Status: DISCONTINUED | OUTPATIENT
Start: 2024-07-10 | End: 2024-07-10 | Stop reason: HOSPADM

## 2024-07-10 RX ORDER — ACETAMINOPHEN 160 MG/5ML
320 SUSPENSION ORAL EVERY 6 HOURS PRN
Status: DISCONTINUED | OUTPATIENT
Start: 2024-07-10 | End: 2024-07-10 | Stop reason: HOSPADM

## 2024-07-10 RX ORDER — PROPOFOL 10 MG/ML
INJECTION, EMULSION INTRAVENOUS AS NEEDED
Status: DISCONTINUED | OUTPATIENT
Start: 2024-07-10 | End: 2024-07-10

## 2024-07-10 RX ORDER — VANCOMYCIN HYDROCHLORIDE 1 G/200ML
1000 INJECTION, SOLUTION INTRAVENOUS ONCE
Status: COMPLETED | OUTPATIENT
Start: 2024-07-10 | End: 2024-07-10

## 2024-07-10 RX ORDER — FENTANYL CITRATE/PF 50 MCG/ML
25 SYRINGE (ML) INJECTION
Status: DISCONTINUED | OUTPATIENT
Start: 2024-07-10 | End: 2024-07-10 | Stop reason: HOSPADM

## 2024-07-10 RX ORDER — LIDOCAINE HYDROCHLORIDE 10 MG/ML
INJECTION, SOLUTION EPIDURAL; INFILTRATION; INTRACAUDAL; PERINEURAL AS NEEDED
Status: DISCONTINUED | OUTPATIENT
Start: 2024-07-10 | End: 2024-07-10

## 2024-07-10 RX ORDER — CEFAZOLIN SODIUM 1 G/50ML
SOLUTION INTRAVENOUS AS NEEDED
Status: DISCONTINUED | OUTPATIENT
Start: 2024-07-10 | End: 2024-07-10

## 2024-07-10 RX ORDER — FENTANYL CITRATE 50 UG/ML
INJECTION, SOLUTION INTRAMUSCULAR; INTRAVENOUS AS NEEDED
Status: DISCONTINUED | OUTPATIENT
Start: 2024-07-10 | End: 2024-07-10

## 2024-07-10 RX ORDER — MINERAL OIL
OIL (ML) MISCELLANEOUS AS NEEDED
Status: DISCONTINUED | OUTPATIENT
Start: 2024-07-10 | End: 2024-07-10 | Stop reason: HOSPADM

## 2024-07-10 RX ORDER — ONDANSETRON 4 MG/1
4 TABLET, ORALLY DISINTEGRATING ORAL EVERY 6 HOURS PRN
Status: DISCONTINUED | OUTPATIENT
Start: 2024-07-10 | End: 2024-07-10 | Stop reason: HOSPADM

## 2024-07-10 RX ADMIN — CEFAZOLIN SODIUM 1000 MG: 1 SOLUTION INTRAVENOUS at 08:14

## 2024-07-10 RX ADMIN — PROPOFOL 60 MCG/KG/MIN: 10 INJECTION, EMULSION INTRAVENOUS at 08:32

## 2024-07-10 RX ADMIN — FENTANYL CITRATE 50 MCG: 50 INJECTION, SOLUTION INTRAMUSCULAR; INTRAVENOUS at 08:31

## 2024-07-10 RX ADMIN — PROPOFOL 20 MG: 10 INJECTION, EMULSION INTRAVENOUS at 08:32

## 2024-07-10 RX ADMIN — LIDOCAINE HYDROCHLORIDE 20 MG: 10 INJECTION, SOLUTION EPIDURAL; INFILTRATION; INTRACAUDAL; PERINEURAL at 08:32

## 2024-07-10 RX ADMIN — FENTANYL CITRATE 25 MCG: 50 INJECTION, SOLUTION INTRAMUSCULAR; INTRAVENOUS at 09:00

## 2024-07-10 RX ADMIN — Medication 50 MCG: at 08:55

## 2024-07-10 RX ADMIN — VANCOMYCIN HYDROCHLORIDE 1000 MG: 1 INJECTION, SOLUTION INTRAVENOUS at 08:27

## 2024-07-10 RX ADMIN — SODIUM CHLORIDE, SODIUM LACTATE, POTASSIUM CHLORIDE, AND CALCIUM CHLORIDE 125 ML/HR: .6; .31; .03; .02 INJECTION, SOLUTION INTRAVENOUS at 06:55

## 2024-07-10 RX ADMIN — FENTANYL CITRATE 25 MCG: 50 INJECTION, SOLUTION INTRAMUSCULAR; INTRAVENOUS at 08:49

## 2024-07-10 NOTE — DISCHARGE INSTR - AVS FIRST PAGE
1.  Walk as little as possible and keep left leg elevated whenever she can  2.  Return office in 2 days

## 2024-07-10 NOTE — INTERVAL H&P NOTE
H&P reviewed. After examining the patient I find no changes in the patients condition since the H&P had been written.    Vitals:    07/10/24 0647   BP: (!) 193/98   Pulse: 76   Resp: 18   Temp: (!) 96.9 °F (36.1 °C)   SpO2: 98%

## 2024-07-10 NOTE — NURSING NOTE
Pt able to tolerate po intake. Pt in no pain/distress. AVS reviewed and pt verbalized understanding. IV removed.

## 2024-07-10 NOTE — ANESTHESIA POSTPROCEDURE EVALUATION
Post-Op Assessment Note    CV Status:  Stable  Pain Score: 0    Pain management: adequate       Mental Status:  Alert and awake   Hydration Status:  Euvolemic   PONV Controlled:  Controlled   Airway Patency:  Patent     Post Op Vitals Reviewed: Yes    No anethesia notable event occurred.    Staff: Anesthesiologist, CRNA   Comments: Report given to recovering RN, VSS, Pt states she is comfortable.            BP   157/78   Temp   99.0   Pulse 64 (07/10/24 0910)   Resp 15 (07/10/24 0910)    SpO2   100

## 2024-07-10 NOTE — OP NOTE
OPERATIVE REPORT  PATIENT NAME: Jason Calderon    :  1933  MRN: [id number]  Pt Location:  OR ROOM 08    SURGERY DATE: 7/10/2024    Surgeons and Role:     * Juan Sexton MD - Primary    Preop and postoperative diagnosis: Left leg wound    Procedure(s):  Left - SKIN GRAFT TO LEG    Operative history: The patient is still sustaining a skin tear to her left lower leg in the anterior aspect a little over a week ago.  Serial debridement was done of the necrotic tissue leaving a 4 x 5 cm defect that has been allowed to granulate so that a skin graft of the same size could be applied at this time.    Operative procedure: Patient was taken to the operating room placed supine on the operating table.  She was prepped and draped in usual fashion.  Anesthesia was general supplemented with Xylocaine 1% with epinephrine.  Following this a patellectomy dermatome was used to harvest a  inch split-thickness skin graft from the left anterior thigh.  The graft was meshed 1-1.  Minimal scraping was done to encourage fibrin in the wound bed itself of the left leg.  The skin graft was then placed on this with staples used for fixation.  Some of the extra skin was returned to the center of the donor site of the left thigh where it also was stapled into position with the intent to hasten donor site healing.  Light dressings were placed on the left thigh.  A bulky wrap was placed on the left leg.  The patient tolerated this procedure well and was taken to the recovery area in good condition.      SIGNATURE: Juan Sexton MD  DATE: July 10, 2024  TIME: 9:30 AM

## (undated) DEVICE — NEEDLE 25G X 1 1/2

## (undated) DEVICE — SPONGE LAP 18 X 18 IN STRL RFD

## (undated) DEVICE — OCCLUSIVE GAUZE STRIP,3% BISMUTH TRIBROMOPHENATE IN PETROLATUM BLEND: Brand: XEROFORM

## (undated) DEVICE — BETHLEHEM UNIVERSAL  MIONR EXT: Brand: CARDINAL HEALTH

## (undated) DEVICE — DISPOSABLE OR TOWEL: Brand: CARDINAL HEALTH

## (undated) DEVICE — PROXIMATE SKIN STAPLERS (35 WIDE) CONTAINS 35 STAINLESS STEEL STAPLES (FIXED HEAD): Brand: PROXIMATE

## (undated) DEVICE — KERLIX BANDAGE ROLL: Brand: KERLIX

## (undated) DEVICE — 3 TO 1 DERMACARRIER: Brand: MESHGRAFTTM II TISSUE EXPANSION SYSTEM

## (undated) DEVICE — STERILE POLYISOPRENE POWDER-FREE SURGICAL GLOVES: Brand: PROTEXIS

## (undated) DEVICE — INTENDED FOR TISSUE SEPARATION, AND OTHER PROCEDURES THAT REQUIRE A SHARP SURGICAL BLADE TO PUNCTURE OR CUT.: Brand: BARD-PARKER ® SAFETYLOCK CARBON RIB-BACK BLADES

## (undated) DEVICE — ACE WRAP 4 IN UNSTERILE

## (undated) DEVICE — GAUZE SPONGES,16 PLY: Brand: CURITY

## (undated) DEVICE — NEEDLE BLUNT 18 G X 1 1/2IN

## (undated) DEVICE — 1.5 TO 1 DERMACARRIER: Brand: MESHGRAFTTM  II TISSUE EXPANSION SYSTEM

## (undated) DEVICE — ABDOMINAL PAD: Brand: DERMACEA

## (undated) DEVICE — SYRINGE 30ML LL

## (undated) DEVICE — BLADE ELECTRO MODEL B DISP

## (undated) DEVICE — SOLUTION BOWL: Brand: KENDALL

## (undated) DEVICE — STOCKINETTE 2P PREROLLD 6X60

## (undated) DEVICE — COBAN 4 IN STERILE

## (undated) DEVICE — SYRINGE 10ML LL CONTROL TOP

## (undated) DEVICE — COTTON TIP APPLICTOR 2 PK

## (undated) DEVICE — BRUSH EZ SCRUB PCMX W/NAIL CLEANER